# Patient Record
Sex: FEMALE | Race: WHITE | NOT HISPANIC OR LATINO | Employment: PART TIME | URBAN - METROPOLITAN AREA
[De-identification: names, ages, dates, MRNs, and addresses within clinical notes are randomized per-mention and may not be internally consistent; named-entity substitution may affect disease eponyms.]

---

## 2017-01-09 ENCOUNTER — PRENATAL OFFICE VISIT (OUTPATIENT)
Dept: FAMILY MEDICINE | Facility: CLINIC | Age: 22
End: 2017-01-09
Payer: COMMERCIAL

## 2017-01-09 VITALS
SYSTOLIC BLOOD PRESSURE: 121 MMHG | TEMPERATURE: 97.3 F | BODY MASS INDEX: 26.82 KG/M2 | DIASTOLIC BLOOD PRESSURE: 61 MMHG | WEIGHT: 155 LBS | HEART RATE: 94 BPM

## 2017-01-09 DIAGNOSIS — O34.219 PREVIOUS CESAREAN DELIVERY, ANTEPARTUM CONDITION OR COMPLICATION: ICD-10-CM

## 2017-01-09 DIAGNOSIS — Z34.80 ENCOUNTER FOR SUPERVISION OF OTHER NORMAL PREGNANCY: Primary | ICD-10-CM

## 2017-01-09 PROCEDURE — 99207 ZZC PRENATAL VISIT: CPT | Performed by: FAMILY MEDICINE

## 2017-01-09 NOTE — NURSING NOTE
"Chief Complaint   Patient presents with     Prenatal Care       Initial /61 mmHg  Pulse 94  Temp(Src) 97.3  F (36.3  C) (Oral)  Wt 155 lb (70.308 kg)  LMP 06/10/2016 (Exact Date) Estimated body mass index is 26.82 kg/(m^2) as calculated from the following:    Height as of 8/3/16: 5' 3.75\" (1.619 m).    Weight as of this encounter: 155 lb (70.308 kg).  BP completed using cuff size: lester Lester CMA      "

## 2017-01-09 NOTE — MR AVS SNAPSHOT
After Visit Summary   2017    Lu Syed    MRN: [id number]           Patient Information     Date Of Birth          1995        Visit Information        Provider Department      2017 12:00 PM Valeria Ward MD [facility]        Today's Diagnoses     Encounter for supervision of other normal pregnancy    -  1     Previous  delivery, antepartum condition or complication           Care Instructions    [place] [facility] [phone]      Next visit 2 weeks        Follow-ups after your visit        Your next 10 appointments already scheduled     2017 12:00 PM   ESTABLISHED PRENATAL with Valeria Ward MD   [facility] ([facility])    [address] [facility] [address] [phone]            2017  1:00 PM   ESTABLISHED PRENATAL with Cephas Mawuena Agbeh, MD   [facility]    [address] [facility] [address] [phone]            2017  1:00 PM   ESTABLISHED PRENATAL with Cephas Mawuena Agbeh, MD   [facility] ([facility])    [address] [address] [phone]              Who to contact     If you have questions or need follow up information about today's clinic visit or your schedule please contact [facility] directly at [phone].  Normal or non-critical lab and imaging results will be communicated to you by MyChart, letter or phone within 4 business days after the clinic has received the results. If you do not hear from us within 7 days, please contact the clinic through MyChart or phone. If you have a critical or abnormal lab result, we will notify you by phone as soon as possible.  Submit refill requests through blinkbox or call your pharmacy and they will forward the refill request to us. Please allow 3 business days for your refill to be completed.           Additional Information About Your Visit        MyChart Information     [employer] gives you secure access to your electronic health record. If you see a primary care provider, you can also send messages to your care team and make appointments. If you have questions, please call your primary care clinic.  If you do not have a primary care provider, please call [phone] and they will assist you.        Care EveryWhere ID     This is your Care EveryWhere ID. This could be used by other organizations to access your [place] medical records  [id number]        Your Vitals Were     Pulse Temperature Last Period             94 97.3  F (36.3  C) (Oral) 06/10/2016 (Exact Date)          Blood Pressure from Last 3 Encounters:   01/09/17 121/61   12/26/16 118/58   12/12/16 102/63    Weight from Last 3 Encounters:   01/09/17 155 lb (70.308 kg)   12/26/16 152 lb 6.4 oz (69.128 kg)   12/12/16 147 lb (66.679 kg)              Today, you had the following     No orders found for display       Primary Care Provider Office Phone # Fax #    Valeria Ward [phone]       [facility] [address] [facility] [address]        Thank you!     Thank you for choosing [facility]  for your care. Our goal is always to provide you with excellent care. Hearing back from our patients is one way we can continue to improve our services. Please take a few minutes to complete the written survey that you may receive in the mail after your visit with us. Thank you!             Your Updated Medication List - Protect others around you: Learn how to safely use, store and throw away your medicines at www.disposemymeds.org.          This list is accurate as of: 1/9/17 12:10 PM.  Always use your most recent med list.                   Brand Name Dispense Instructions for use    order for DME     1 each    Short boot/walking boot. R. Size small.       PRENATAL VITAMIN PO      Take 1 tablet by mouth daily

## 2017-01-09 NOTE — PROGRESS NOTES
Reportable signs and symptoms discussed.  Prenatal flowsheet information is reviewed.  Doing well.  No concerns today.  Discussed kick counts and fetal movement.  Another visit with Valeria Ward MD in 2 weeks then will transfer care to OB for repeat .

## 2017-01-22 NOTE — PROGRESS NOTES
Doing well.  No concerns today.  Prenatal flowsheet information is reviewed.  Discussed kick counts and fetal movement.  Reportable signs and symptoms discussed.  tdap today,  Next visit with Dr. Agbeh then f/u until  with Valeria Ward MD

## 2017-01-22 NOTE — PATIENT INSTRUCTIONS
[place] [facility] [phone]       Next visit 2 weeks with Dr. Agbeh, then Valeria Ward MD at 36 weeks.

## 2017-01-23 ENCOUNTER — PRENATAL OFFICE VISIT (OUTPATIENT)
Dept: FAMILY MEDICINE | Facility: CLINIC | Age: 22
End: 2017-01-23
Payer: COMMERCIAL

## 2017-01-23 VITALS
WEIGHT: 154 LBS | HEART RATE: 93 BPM | SYSTOLIC BLOOD PRESSURE: 117 MMHG | DIASTOLIC BLOOD PRESSURE: 57 MMHG | TEMPERATURE: 98.7 F | BODY MASS INDEX: 26.65 KG/M2

## 2017-01-23 DIAGNOSIS — O34.219 PREVIOUS CESAREAN DELIVERY, ANTEPARTUM CONDITION OR COMPLICATION: ICD-10-CM

## 2017-01-23 DIAGNOSIS — Z34.80 ENCOUNTER FOR SUPERVISION OF OTHER NORMAL PREGNANCY: Primary | ICD-10-CM

## 2017-01-23 DIAGNOSIS — Z23 NEED FOR TDAP VACCINATION: ICD-10-CM

## 2017-01-23 PROCEDURE — 90715 TDAP VACCINE 7 YRS/> IM: CPT | Performed by: FAMILY MEDICINE

## 2017-01-23 PROCEDURE — 99207 ZZC PRENATAL VISIT: CPT | Performed by: FAMILY MEDICINE

## 2017-01-23 NOTE — NURSING NOTE
"Chief Complaint   Patient presents with     Prenatal Care       Initial /57 mmHg  Pulse 93  Temp(Src) 98.7  F (37.1  C) (Oral)  Wt 154 lb (69.854 kg)  LMP 06/10/2016 (Exact Date) Estimated body mass index is 26.65 kg/(m^2) as calculated from the following:    Height as of 8/3/16: 5' 3.75\" (1.619 m).    Weight as of this encounter: 154 lb (69.854 kg).  BP completed using cuff size: lester Lester CMA      "

## 2017-01-23 NOTE — MR AVS SNAPSHOT
After Visit Summary   2017    Lu Syed    MRN: [id number]           Patient Information     Date Of Birth          1995        Visit Information        Provider Department      2017 12:00 PM Valeria Ward MD [facility]        Today's Diagnoses     Encounter for supervision of other normal pregnancy    -  1     Previous  delivery, antepartum condition or complication         Need for Tdap vaccination           Care Instructions    [place] [facility] [phone]       Next visit 2 weeks with Dr. Agbeh, then Valeria Ward MD at 36 weeks.        Follow-ups after your visit        Your next 10 appointments already scheduled     2017  1:00 PM   ESTABLISHED PRENATAL with Cephas Mawuena Agbeh, MD   [facility]    [address]  [place] [id number] [phone]            2017  1:00 PM   ESTABLISHED PRENATAL with Cephas Mawuena Agbeh, MD   [facility]    [address] [facility] [id number] [phone]              Who to contact     If you have questions or need follow up information about today's clinic visit or your schedule please contact [facility] directly at [phone].  Normal or non-critical lab and imaging results will be communicated to you by MyChart, letter or phone within 4 business days after the clinic has received the results. If you do not hear from us within 7 days, please contact the clinic through MyChart or phone. If you have a critical or abnormal lab result, we will notify you by phone as soon as possible.  Submit refill requests through [employer] or call your pharmacy and they will forward the refill request to us. Please allow 3 business days for your refill to be completed.          Additional Information About Your Visit        [employer] Information     [employer] gives you secure access to your  electronic health record. If you see a primary care provider, you can also send messages to your care team and make appointments. If you have questions, please call your primary care clinic.  If you do not have a primary care provider, please call [phone] and they will assist you.        Care EveryWhere ID     This is your Care EveryWhere ID. This could be used by other organizations to access your [place] medical records  [id number]        Your Vitals Were     Pulse Temperature Last Period             93 98.7  F (37.1  C) (Oral) 06/10/2016 (Exact Date)          Blood Pressure from Last 3 Encounters:   01/23/17 117/57   01/09/17 121/61   12/26/16 118/58    Weight from Last 3 Encounters:   01/23/17 154 lb (69.854 kg)   01/09/17 155 lb (70.308 kg)   12/26/16 152 lb 6.4 oz (69.128 kg)              We Performed the Following     TDAP (ADACEL AGES 11-64)        Primary Care Provider Office Phone # Fax #    Valeria Ward [phone]       [facility] [address] [facility] [address]        Thank you!     Thank you for choosing [facility]  for your care. Our goal is always to provide you with excellent care. Hearing back from our patients is one way we can continue to improve our services. Please take a few minutes to complete the written survey that you may receive in the mail after your visit with us. Thank you!             Your Updated Medication List - Protect others around you: Learn how to safely use, store and throw away your medicines at www.disposemymeds.org.          This list is accurate as of: 1/23/17 12:11 PM.  Always use your most recent med list.                   Brand Name Dispense Instructions for use    order for DME     1 each    Short boot/walking boot. R. Size small.       PRENATAL VITAMIN PO      Take 1 tablet by mouth daily

## 2017-02-06 ENCOUNTER — PRENATAL OFFICE VISIT (OUTPATIENT)
Dept: OBGYN | Facility: CLINIC | Age: 22
End: 2017-02-06
Payer: COMMERCIAL

## 2017-02-06 VITALS
HEART RATE: 103 BPM | DIASTOLIC BLOOD PRESSURE: 82 MMHG | BODY MASS INDEX: 27.17 KG/M2 | OXYGEN SATURATION: 96 % | TEMPERATURE: 98.3 F | SYSTOLIC BLOOD PRESSURE: 142 MMHG | WEIGHT: 157 LBS

## 2017-02-06 DIAGNOSIS — O34.219 PREVIOUS CESAREAN DELIVERY, ANTEPARTUM CONDITION OR COMPLICATION: Primary | ICD-10-CM

## 2017-02-06 DIAGNOSIS — Z34.80 ENCOUNTER FOR SUPERVISION OF OTHER NORMAL PREGNANCY: ICD-10-CM

## 2017-02-06 PROCEDURE — 99207 ZZC PRENATAL VISIT: CPT | Performed by: OBSTETRICS & GYNECOLOGY

## 2017-02-06 NOTE — PATIENT INSTRUCTIONS
If you have any questions regarding your visit, Please contact your care team.    Women s Health CLINIC HOURS TELEPHONE NUMBER   Wilma Agbeh, M.D.    Sabine Manjarrez- RAMIREZ Castillo - RAMIREZ Nesbitt -         Monday-[facility]  8:00 am - 5 pm  Tuesday- [facility]  8:00am- 5 pm  Wednesday- Off  Thursday- [facility]  8:00 am- 5 pm  Friday-[place]  8:00 am 5 pm [facility]  [address] [place] [address]  [phone] ask for Women's [facility]    Imaging [phone]    [facility]  [address] [address]  [place] [address] [phone]  Imaging [phone]     Urgent Care locations:    [facility] Saturday and Sunday   9 am - 5 pm    Monday-Friday   12 pm - 8 pm  Saturday and Sunday   9 am - 5 pm   [phone]       If you need a medication refill, please contact your pharmacy. Please allow 3 business days for your refill to be completed.  As always, Thank you for trusting us with your healthcare needs!

## 2017-02-06 NOTE — MR AVS SNAPSHOT
After Visit Summary   2/6/2017    Lu Syed    MRN: [id number]           Patient Information     Date Of Birth          1995        Visit Information        Provider Department      2/6/2017 1:00 PM Agbeh, Cephas Mawuena, MD [facility]        Care Instructions                                                           If you have any questions regarding your visit, Please contact your care team.    Women s Health CLINIC HOURS TELEPHONE NUMBER   Cephas Agbeh, M.D. Kristen - CARMEL Manjarrez- RAMIREZ Castillo - RAMIREZ Nesbitt -         Monday-[facility]  8:00 am - 5 pm  Tuesday- [facility]  8:00am- 5 pm  Wednesday- Off  Thursday- [facility]  8:00 am- 5 pm  Friday-[place]  8:00 am 5 pm [facility]  [address] [place] [address]  [phone] ask for Women's [facility]    Imaging [phone]    [facility]  [address] [place], [place] [address] [phone]  Imaging [phone]     Urgent Care locations:    [facility] Saturday and Sunday   9 am - 5 pm    Monday-Friday   12 pm - 8 pm  Saturday and Sunday   9 am - 5 pm   [phone]       If you need a medication refill, please contact your pharmacy. Please allow 3 business days for your refill to be completed.  As always, Thank you for trusting us with your healthcare needs!               Follow-ups after your visit        Your next 10 appointments already scheduled     Feb 20, 2017 12:00 PM   ESTABLISHED PRENATAL [facility]    [address] [facility] [address] [phone]            Feb 27, 2017  1:00 PM   ESTABLISHED PRENATAL with Cephas Mawuena Agbeh, MD   [facility]    [address] [facility] [address]   [phone]            Mar 06, 2017 12:00 PM   ESTABLISHED PRENATAL  with Valeria Ward MD   [facility] ([facility]    [address] [facility] [address] [phone]              Who to contact     If you have questions or need follow up information about today's clinic visit or your schedule please contact [facility] directly at [phone].  Normal or non-critical lab and imaging results will be communicated to you by MyChart, letter or phone within 4 business days after the clinic has received the results. If you do not hear from us within 7 days, please contact the clinic through "[employer]hart or phone. If you have a critical or abnormal lab result, we will notify you by phone as soon as possible.  Submit refill requests through [employer] or call your pharmacy and they will forward the refill request to us. Please allow 3 business days for your refill to be completed.          Additional Information About Your Visit        "[employer]Information Gateway Information     [employer] gives you secure access to your electronic health record. If you see a primary care provider, you can also send messages to your care team and make appointments. If you have questions, please call your primary care clinic.  If you do not have a primary care provider, please call [phone] and they will assist you.        Care EveryWhere ID     This is your Care EveryWhere ID. This could be used by other organizations to access your [place] medical records  [id number]        Your Vitals Were     Pulse Temperature Pulse Oximetry Last Period          103 98.3  F (36.8  C) (Tympanic) 96% 06/10/2016 (Exact Date)         Blood Pressure from Last 3 Encounters:   02/06/17 142/82   01/23/17 117/57   01/09/17 121/61    Weight from Last 3 Encounters:   02/06/17 157 lb (71.215 kg)   01/23/17 154 lb (69.854 kg)   01/09/17 155 lb (70.308 kg)              Today, you had the following     No orders found for display       Primary Care Provider Office Phone # Fax #    Valeria Pike  MD Ed [phone]       [facility] [address] [address] [facility] [address]        Thank you!     Thank you for choosing [facility]  for your care. Our goal is always to provide you with excellent care. Hearing back from our patients is one way we can continue to improve our services. Please take a few minutes to complete the written survey that you may receive in the mail after your visit with us. Thank you!             Your Updated Medication List - Protect others around you: Learn how to safely use, store and throw away your medicines at www.disposemymeds.org.          This list is accurate as of: 2/6/17  1:07 PM.  Always use your most recent med list.                   Brand Name Dispense Instructions for use    order for DME     1 each    Short boot/walking boot. R. Size small.       PRENATAL VITAMIN PO      Take 1 tablet by mouth daily

## 2017-02-06 NOTE — NURSING NOTE
"Chief Complaint   Patient presents with     Prenatal Care     34.3       Initial /82 mmHg  Pulse 103  Temp(Src) 98.3  F (36.8  C) (Tympanic)  Wt 157 lb (71.215 kg)  SpO2 96%  LMP 06/10/2016 (Exact Date) Estimated body mass index is 27.17 kg/(m^2) as calculated from the following:    Height as of 8/3/16: 5' 3.75\" (1.619 m).    Weight as of this encounter: 157 lb (71.215 kg)..  Sabine Jessica LPN   "

## 2017-02-06 NOTE — PROGRESS NOTES
34w3d.  Tired.  No HA, visual changes, N/V etc.Asnwered all her questions regarding her upcoming repeat C/S on 3/10/17. Routine prenatal care.. RTC 2 wk/prn.    ICD-10-CM    1. Previous  delivery, antepartum condition or complication O34.219    2. Encounter for supervision of other normal pregnancy Z34.80      CEPHAS AGBEH, MD.

## 2017-02-07 ENCOUNTER — TELEPHONE (OUTPATIENT)
Dept: FAMILY MEDICINE | Facility: CLINIC | Age: 22
End: 2017-02-07

## 2017-02-07 NOTE — TELEPHONE ENCOUNTER
Patient is having repeat C/S with Dr Agbeh, forwarding message onto his care team.  Osiris Paul RN

## 2017-02-07 NOTE — TELEPHONE ENCOUNTER
Left message on answering machine for patient/parent to call back.   [phone].  Osiris Paul RN

## 2017-02-07 NOTE — TELEPHONE ENCOUNTER
This writer attempted to contact Lu on 02/07/2017.    Was call answered?  No.  Left message on voicemail with information to call me back. Explained clinic is closing at 1700. Not sure if pt fell hard and landed on her stomach or her back. Not sure if she is bleeding. Do noted no abd pain. Explained if she landed hard and is having bleeding she needs to be seen to evaluate and the best place to be seen would be L & D. Explained she can call back after hours if concerned and she can speak to FNA to get advise.     If patient calls back, please Contact Clinic RN team. If no one available, send encounter message    Edwige Wang RN, BAN

## 2017-02-07 NOTE — TELEPHONE ENCOUNTER
Patient is calling stating 34 weeks pregnant and fell on the ice wondering if she should come in to be seen what she should do. Patient is not having any abdominal pain or pain anywhere else, just fell and coworker at [facility] [employer] suggest she should call. Please call to discuss. Thank you.

## 2017-02-08 NOTE — TELEPHONE ENCOUNTER
Per patient, fell yesterday at on the ice and fell onto her buttocks.    Fell hard.  Was holding her 1.5 year old.    Did not follow up.   Good fetal movement.  Was working at [employer].   Has cramping, which is normal, gerson sanchez.     Information above is reviewed with Dr Valeria Ward, as long as patient is feeling well, has GFM, no cramping or bleeding, will have patient continue to monitor and follow up prn.  The patient/parent agrees with the plan and verbalized good understanding.    Per protocol, will route encounter to be cosigned by provider for Verbal Orders.  Osiris Paul RN

## 2017-02-20 ENCOUNTER — PRENATAL OFFICE VISIT (OUTPATIENT)
Dept: FAMILY MEDICINE | Facility: CLINIC | Age: 22
End: 2017-02-20
Payer: COMMERCIAL

## 2017-02-20 VITALS
DIASTOLIC BLOOD PRESSURE: 70 MMHG | TEMPERATURE: 98.2 F | SYSTOLIC BLOOD PRESSURE: 127 MMHG | WEIGHT: 160 LBS | HEART RATE: 93 BPM | BODY MASS INDEX: 27.68 KG/M2

## 2017-02-20 DIAGNOSIS — Z34.80 ENCOUNTER FOR SUPERVISION OF OTHER NORMAL PREGNANCY: Primary | ICD-10-CM

## 2017-02-20 PROCEDURE — 99207 ZZC PRENATAL VISIT: CPT | Performed by: FAMILY MEDICINE

## 2017-02-20 PROCEDURE — 87653 STREP B DNA AMP PROBE: CPT | Performed by: FAMILY MEDICINE

## 2017-02-20 NOTE — NURSING NOTE
"Chief Complaint   Patient presents with     Prenatal Care       Initial /70  Pulse 93  Temp 98.2  F (36.8  C) (Oral)  Wt 160 lb (72.6 kg)  LMP 06/10/2016 (Exact Date)  BMI 27.68 kg/m2 Estimated body mass index is 27.68 kg/(m^2) as calculated from the following:    Height as of 8/3/16: 5' 3.75\" (1.619 m).    Weight as of this encounter: 160 lb (72.6 kg).  Medication Reconciliation: complete  Usha Lester , CMA     "

## 2017-02-20 NOTE — MR AVS SNAPSHOT
After Visit Summary   2/20/2017    uL Syed    MRN: [id number]           Patient Information     Date Of Birth          1995        Visit Information        Provider Department      2/20/2017 12:00 PM Valeria Ward MD [facility]        Today's Diagnoses     Encounter for supervision of other normal pregnancy    -  1      Care Instructions    [place] [facility] [phone]       Weekly visits        Follow-ups after your visit        Your next 10 appointments already scheduled     Feb 27, 2017  1:00 PM CST   ESTABLISHED PRENATAL with Cephas Mawuena Agbeh, MD   [facility]    [address] [facility] [address] [phone]            Mar 06, 2017 12:00 PM CST   ESTABLISHED PRENATAL with Valeria Ward MD   [facility]    [address] [facility] [address] [phone]              Who to contact     If you have questions or need follow up information about today's clinic visit or your schedule please contact [facility] directly at [phone].  Normal or non-critical lab and imaging results will be communicated to you by MyChart, letter or phone within 4 business days after the clinic has received the results. If you do not hear from us within 7 days, please contact the clinic through GoodGuidehart or phone. If you have a critical or abnormal lab result, we will notify you by phone as soon as possible.  Submit refill requests through [employer]Go or call your pharmacy and they will forward the refill request to us. Please allow 3 business days for your refill to be completed.          Additional Information About Your Visit        MyChart Information     [employer] gives you secure access to your electronic health record. If you see a primary care provider, you can also send messages to your care team and make appointments. If you have questions, please call  your primary care clinic.  If you do not have a primary care provider, please call [phone] and they will assist you.        Care EveryWhere ID     This is your Care EveryWhere ID. This could be used by other organizations to access your [place] medical records  [id number]        Your Vitals Were     Pulse Temperature Last Period BMI (Body Mass Index)          93 98.2  F (36.8  C) (Oral) 06/10/2016 (Exact Date) 27.68 kg/m2         Blood Pressure from Last 3 Encounters:   02/20/17 127/70   02/06/17 142/82   01/23/17 117/57    Weight from Last 3 Encounters:   02/20/17 160 lb (72.6 kg)   02/06/17 157 lb (71.2 kg)   01/23/17 154 lb (69.9 kg)              We Performed the Following     Group B strep PCR        Primary Care Provider Office Phone # Fax #    Valeria Ward [phone]       [facility] [address] [facility] [address]        Thank you!     Thank you for choosing [facility]  for your care. Our goal is always to provide you with excellent care. Hearing back from our patients is one way we can continue to improve our services. Please take a few minutes to complete the written survey that you may receive in the mail after your visit with us. Thank you!             Your Updated Medication List - Protect others around you: Learn how to safely use, store and throw away your medicines at www.disposemymeds.org.          This list is accurate as of: 2/20/17 12:07 PM.  Always use your most recent med list.                   Brand Name Dispense Instructions for use    PRENATAL VITAMIN PO      Take 1 tablet by mouth daily

## 2017-02-20 NOTE — PROGRESS NOTES
Doing well.  No concerns today.  Cervix is posterior, soft, closed, -1.  Cephalic position confirmed by Leopold maneuvers and cervical exam.  GBS done today.  Prenatal flowsheet information is reviewed.  Reportable signs and symptoms discussed.  F/u weekly

## 2017-02-21 LAB
GP B STREP DNA SPEC QL NAA+PROBE: NORMAL
SPECIMEN SOURCE: NORMAL

## 2017-02-23 ENCOUNTER — TELEPHONE (OUTPATIENT)
Dept: NURSING | Facility: CLINIC | Age: 22
End: 2017-02-23

## 2017-02-23 NOTE — TELEPHONE ENCOUNTER
"Call Type: Triage Call    Presenting Problem: Patient is \"37 weeks pregnant\" and is having  \"contractions/ sharp abdominal pain that is constant.\" Triaged for  pregnancy: labor, 20-37 weeks/Disposition to call provider  immediately. Triager called out to answering service, left message  for on call provider to call patient at [phone].  Triage Note:  Guideline Title: Pregnancy: Signs of Labor, 37 Weeks or Greater ;  Pregnancy:  Labor, 20 to 37 Weeks  Recommended Disposition: Call Provider Immediately  Original Inclination: Wanted to speak with a nurse  Override Disposition:  Intended Action: Call PCP/HCP  Physician Contacted: No  Gestation 20 to 37 weeks ?  YES  New or worsening signs and symptoms that may indicate shock ? NO  Feeling of baby coming or wanting to push (urge to bear down) ? NO  Umbilical cord or any part of the baby (head, bottom, arm or leg) at the opening  of the vagina ? NO  No relief between contractions ? NO  Continuous bright red vaginal bleeding for more than 15 minutes (more than  spotting) ? NO  Sudden gush or trickle of fluid from the vagina ? NO  New or worsening signs and symptoms that may indicate shock ? NO  Gestation more than 37 weeks AND signs of labor ? NO  Gestation less than 20 weeks AND signs of labor ? NO  Feeling of baby coming or wanting to push (urge to bear down) ? NO  Unbearable abdominal/pelvic pain ? NO  Umbilical cord or any part of the baby (head, bottom, arm or leg) at the opening  of the vagina ? NO  Gush or leakage of green or green-tinged or port-wine colored fluid (reddish and  watery) from the vagina ? NO  Contractions occurring 4 times every 20 minutes OR 8 contractions occurring in an  hour. ? NO  No relief between contractions ? NO  Continuous bright red vaginal bleeding for more than 15 minutes (more than  spotting) ? NO  Gestation 20 weeks or more AND decreased fetal movement compared to previous  activity ? NO  Physician Instructions:  Care " Advice: Do not give the patient anything to eat or drink.  IMMEDIATE ACTION  See another provider immediately if unable to talk with your provider  within 1 hour. Follow the directions from your provider's on call resource  if you are unable to speak to your provider directly.  You may be directed  to go to the hospital's Labor & Delivery department for evaluation. Another  adult should drive.  Lie on left side to improve circulation to the fetus.  Call  if any of these occur: profuse bright red vaginal bleeding  continuous (without relaxation) abdominal pain  the umbilical cord or any fetal part in vagina  bag of suarez coming through vagina  feeling of wanting to push or have a bowel movement.

## 2017-02-27 ENCOUNTER — PRENATAL OFFICE VISIT (OUTPATIENT)
Dept: OBGYN | Facility: CLINIC | Age: 22
End: 2017-02-27
Payer: COMMERCIAL

## 2017-02-27 VITALS
HEART RATE: 87 BPM | OXYGEN SATURATION: 96 % | TEMPERATURE: 97.5 F | SYSTOLIC BLOOD PRESSURE: 114 MMHG | BODY MASS INDEX: 27.85 KG/M2 | DIASTOLIC BLOOD PRESSURE: 59 MMHG | WEIGHT: 161 LBS

## 2017-02-27 DIAGNOSIS — Z34.80 ENCOUNTER FOR SUPERVISION OF OTHER NORMAL PREGNANCY: ICD-10-CM

## 2017-02-27 DIAGNOSIS — O34.219 PREVIOUS CESAREAN DELIVERY, ANTEPARTUM CONDITION OR COMPLICATION: Primary | ICD-10-CM

## 2017-02-27 PROCEDURE — 99207 ZZC PRENATAL VISIT: CPT | Performed by: OBSTETRICS & GYNECOLOGY

## 2017-02-27 NOTE — MR AVS SNAPSHOT
After Visit Summary   2/27/2017    Lu Syed    MRN: [id number]           Patient Information     Date Of Birth          1995        Visit Information        Provider Department      2/27/2017 1:00 PM Agbeh, Cephas Mawuena, MD [facility]        Care Instructions                                                           If you have any questions regarding your visit, Please contact your care team.    Women s Health CLINIC HOURS TELEPHONE NUMBER   Cephas Agbeh, M.D. Kristen - CARMEL Manjarrez- RAMIREZ Castillo - RAMIREZ Nesbitt -         Monday-[facility]  8:00 am - 5 pm  Tuesday- [facility]  8:00am- 5 pm  Wednesday- Off  Thursday- [facility]  8:00 am- 5 pm  Friday-[place]  8:00 am 5 pm [facility]  [address]  [place], [place] [address] [phone] ask for [facility]    Imaging [phone]    [facility]  [address] [place], [place] [address] [phone]  Imaging [phone]     Urgent Care locations:    [facility] Saturday and Sunday   9 am - 5 pm    Monday-Friday   12 pm - 8 pm  Saturday and Sunday   9 am - 5 pm   [phone]       If you need a medication refill, please contact your pharmacy. Please allow 3 business days for your refill to be completed.  As always, Thank you for trusting us with your healthcare needs!               Follow-ups after your visit        Your next 10 appointments already scheduled     Feb 27, 2017  1:00 PM CST   ESTABLISHED PRENATAL with Cephas Mawuena Agbeh, MD   [facility] ([facility])    [address] [id number] [phone]            Mar 06, 2017 12:00 PM CST   ESTABLISHED PRENATAL with Valeria Ward MD   [facility]    [address] [facility] [address] [phone]              Who to contact     If you have  questions or need follow up information about today's clinic visit or your schedule please contact [facility] directly at [phone].  Normal or non-critical lab and imaging results will be communicated to you by MyChart, letter or phone within 4 business days after the clinic has received the results. If you do not hear from us within 7 days, please contact the clinic through Courseloadhart or phone. If you have a critical or abnormal lab result, we will notify you by phone as soon as possible.  Submit refill requests through OpenTable or call your pharmacy and they will forward the refill request to us. Please allow 3 business days for your refill to be completed.          Additional Information About Your Visit        CourseloadharJajah Information     OpenTable gives you secure access to your electronic health record. If you see a primary care provider, you can also send messages to your care team and make appointments. If you have questions, please call your primary care clinic.  If you do not have a primary care provider, please call [phone] and they will assist you.        Care EveryWhere ID     This is your Care EveryWhere ID. This could be used by other organizations to access your [place] medical records  [id number]        Your Vitals Were     Pulse Temperature Last Period Pulse Oximetry BMI (Body Mass Index)       87 97.5  F (36.4  C) (Tympanic) 06/10/2016 (Exact Date) 96% 27.85 kg/m2        Blood Pressure from Last 3 Encounters:   02/27/17 114/59   02/20/17 127/70   02/06/17 142/82    Weight from Last 3 Encounters:   02/27/17 161 lb (73 kg)   02/20/17 160 lb (72.6 kg)   02/06/17 157 lb (71.2 kg)              Today, you had the following     No orders found for display       Primary Care Provider Office Phone # Fax #    Valeria Ward [phone]       [facility] [address] [facility] [address]        Thank you!     [facility] your [facility]. Our goal is always to provide you with excellent care. Hearing back from our patients is one way we can continue to improve our services. Please take a few minutes to complete the written survey that you may receive in the mail after your visit with us. Thank you!             Your Updated Medication List - Protect others around you: Learn how to safely use, store and throw away your medicines at www.disposemymeds.org.          This list is accurate as of: 2/27/17 12:56 PM.  Always use your most recent med list.                   Brand Name Dispense Instructions for use    PRENATAL VITAMIN PO      Take 1 tablet by mouth daily

## 2017-02-27 NOTE — PATIENT INSTRUCTIONS
If you have any questions regarding your visit, Please contact your care team.    Women s Health CLINIC HOURS TELEPHONE NUMBER   Wilma Agbeh, M.D.    Sabine Manjarrez- RAMIREZ Castillo - RAMIREZ Nesbitt -         Monday-[facility]  8:00 am - 5 pm  Tuesday- [facility]  8:00am- 5 pm  Wednesday- Off  Thursday- [facility]  8:00 am- 5 pm  Friday-[place]  8:00 am 5 pm [facility]  [address] [place] [address]  [phone] ask for [facility]    Imaging [phone]    [facility]  [address] [facility]  [place]ELA Valenzuela [address] [phone]  Imaging [phone]     Urgent Care locations:    [facility] Saturday and Sunday   9 am - 5 pm    Monday-Friday   12 pm - 8 pm  Saturday and Sunday   9 am - 5 pm   [phone]       If you need a medication refill, please contact your pharmacy. Please allow 3 business days for your refill to be completed.  As always, Thank you for trusting us with your healthcare needs!

## 2017-02-27 NOTE — PROGRESS NOTES
37w3d  Was evaluated on L&D for threatened PTL last week. Contractions have resolved. She is back to work today.. Repeat C/S on 3/10/17..RTC 1 wk/prn.        ICD-10-CM    1. Previous  delivery, antepartum condition or complication O34.219    2. Encounter for supervision of other normal pregnancy Z34.80      CEPHAS AGBEH, MD.

## 2017-02-27 NOTE — NURSING NOTE
"Chief Complaint   Patient presents with     Prenatal Care     37.3       Initial /59  Pulse 87  Temp 97.5  F (36.4  C) (Tympanic)  Wt 161 lb (73 kg)  LMP 06/10/2016 (Exact Date)  SpO2 96%  BMI 27.85 kg/m2 Estimated body mass index is 27.85 kg/(m^2) as calculated from the following:    Height as of 8/3/16: 5' 3.75\" (1.619 m).    Weight as of this encounter: 161 lb (73 kg).  Medication Reconciliation: complete   "

## 2017-03-06 ENCOUNTER — PRENATAL OFFICE VISIT (OUTPATIENT)
Dept: FAMILY MEDICINE | Facility: CLINIC | Age: 22
End: 2017-03-06
Payer: COMMERCIAL

## 2017-03-06 VITALS
TEMPERATURE: 97.5 F | BODY MASS INDEX: 28.2 KG/M2 | SYSTOLIC BLOOD PRESSURE: 121 MMHG | OXYGEN SATURATION: 95 % | DIASTOLIC BLOOD PRESSURE: 67 MMHG | WEIGHT: 163 LBS | HEART RATE: 93 BPM

## 2017-03-06 DIAGNOSIS — Z34.80 ENCOUNTER FOR SUPERVISION OF OTHER NORMAL PREGNANCY: ICD-10-CM

## 2017-03-06 DIAGNOSIS — O34.219 PREVIOUS CESAREAN DELIVERY, ANTEPARTUM CONDITION OR COMPLICATION: ICD-10-CM

## 2017-03-06 DIAGNOSIS — Z01.818 PREOP GENERAL PHYSICAL EXAM: Primary | ICD-10-CM

## 2017-03-06 PROCEDURE — 99214 OFFICE O/P EST MOD 30 MIN: CPT | Performed by: FAMILY MEDICINE

## 2017-03-06 NOTE — PATIENT INSTRUCTIONS
[place] [facility] [phone]        Call after delivery for appointment for baby.  Before Your Surgery      Call your surgeon if there is any change in your health. This includes signs of a cold or flu (such as a sore throat, runny nose, cough, rash or fever).    Do not smoke, drink alcohol or take over the counter medicine (unless your surgeon or primary care doctor tells you to) for the 24 hours before and after surgery.    If you take prescribed drugs: Follow your doctor s orders about which medicines to take and which to stop until after surgery.    Eating and drinking prior to surgery: follow the instructions from your surgeon    Take a shower or bath the night before surgery. Use the soap your surgeon gave you to gently clean your skin. If you do not have soap from your surgeon, use your regular soap. Do not shave or scrub the surgery site.  Wear clean pajamas and have clean sheets on your bed.

## 2017-03-06 NOTE — PROGRESS NOTES
[facility]  [address] [address] [facility] [address]  [phone]  Dept: [phone]    PRE-OP EVALUATION:  Today's date: 3/6/2017    Lu Syed (: 1995) presents for pre-operative evaluation assessment as requested by Dr. Agbeh.  She requires evaluation and anesthesia risk assessment prior to undergoing surgery/procedure for treatment of  .  Proposed procedure:      Date of Surgery/ Procedure: 03/10/2017  Time of Surgery/ Procedure: 12:30p  Hospital/Surgical Facility: [facility]   Primary Physician: Valeria Ward  Type of Anesthesia Anticipated: Spinal    Patient has a Health Care Directive or Living Will:  NO    1. NO - Do you have a history of heart attack, stroke, stent, bypass or surgery on an artery in the head, neck, heart or legs?  2. NO - Do you ever have any pain or discomfort in your chest?  3. NO - Do you have a history of  Heart Failure?  4. NO - Are you troubled by shortness of breath when: walking on the level, up a slight hill or at night?  5. NO - Do you currently have a cold, bronchitis or other respiratory infection?  6. NO - Do you have a cough, shortness of breath or wheezing?  7. NO - Do you sometimes get pains in the calves of your legs when you walk?  8. NO - Do you or anyone in your family have previous history of blood clots?  9. NO - Do you or does anyone in your family have a serious bleeding problem such as prolonged bleeding following surgeries or cuts?  10. YES - HAVE YOU EVER HAD PROBLEMS WITH ANEMIA OR BEEN TOLD TO TAKE IRON PILLS? Only while pregnant  11. NO - Have you had any abnormal blood loss such as black, tarry or bloody stools, or abnormal vaginal bleeding?  12. NO - Have you ever had a blood transfusion?  13. NO - Have you or any of your relatives ever had problems with anesthesia?  14. NO - Do you have sleep apnea, excessive snoring or daytime drowsiness?  15. NO - Do you have any prosthetic heart  valves?  16. NO - Do you have prosthetic joints?  17. YES - IS THERE ANY CHANCE THAT YOU MAY BE PREGNANT? Yes- patient currently pregnant      HPI:                                                      Brief HPI related to upcoming procedure: repeat low transverse .  Normal uncomplicated pregnancy      See problem list for active medical problems.  Problems all longstanding and stable, except as noted/documented.  See ROS for pertinent symptoms related to these conditions.                                                                                                  .    MEDICAL HISTORY:                                                      Patient Active Problem List    Diagnosis Date Noted     Encounter for supervision of other normal pregnancy 2015     Priority: Medium     Diagnosis updated by automated process. Provider to review and confirm.       Previous  delivery, antepartum condition or complication 2015     Priority: Medium      History reviewed. No pertinent past medical history.  Past Surgical History   Procedure Laterality Date      section  , 2015     x 2     Current Outpatient Prescriptions   Medication Sig Dispense Refill     Prenatal Vit-Fe Fumarate-FA (PRENATAL VITAMIN PO) Take 1 tablet by mouth daily       OTC products: None, except as noted above    No Known Allergies   Latex Allergy: NO    Social History   Substance Use Topics     Smoking status: Never Smoker     Smokeless tobacco: Never Used     Alcohol use No     History   Drug Use No       REVIEW OF SYSTEMS:                                                    C: NEGATIVE for fever, chills, change in weight  E/M: NEGATIVE for ear, mouth and throat problems  R: NEGATIVE for significant cough or SOB  CV: NEGATIVE for chest pain, palpitations or peripheral edema    EXAM:                                                    /67  Pulse 93  Temp 97.5  F (36.4  C) (Oral)  Wt 163 lb (73.9 kg)  LMP 06/10/2016  (Exact Date)  SpO2 95%  BMI 28.2 kg/m2  GENERAL APPEARANCE: healthy, alert and no distress  HENT: ear canals and TM's normal and nose and mouth without ulcers or lesions  RESP: lungs clear to auscultation - no rales, rhonchi or wheezes  CV: regular rate and rhythm, normal S1 S2, no S3 or S4 and no murmur, click or rub   ABDOMEN: soft, nontender, no HSM or masses and bowel sounds normal  NEURO: Normal strength and tone, sensory exam grossly normal, mentation intact and speech normal    DIAGNOSTICS:                                                    No labs or EKG required for low risk surgery (cataract, skin procedure, breast biopsy, etc)    Recent Labs   Lab Test  16   1729  16   1044  10/02/14   HGB  10.2*  12.1   < >  13   PLT   --   356   --   405    < > = values in this interval not displayed.        IMPRESSION:                                                    Reason for surgery/procedure: repeat low transverse .  Normal uncomplicated pregnancy      The proposed surgical procedure is considered LOW risk.    REVISED CARDIAC RISK INDEX  The patient has the following serious cardiovascular risks for perioperative complications such as (MI, PE, VFib and 3  AV Block):  No serious cardiac risks  INTERPRETATION: 0 risks: Class I (very low risk - 0.4% complication rate)    The patient has the following additional risks for perioperative complications:  No identified additional risks      ICD-10-CM    1. Preop general physical exam Z01.818    2. Encounter for supervision of other normal pregnancy Z34.80    3. Previous  delivery, antepartum condition or complication O34.219        RECOMMENDATIONS:                                                          --Patient is to take all scheduled medications on the day of surgery.  APPROVAL GIVEN to proceed with proposed procedure, without further diagnostic evaluation       Signed Electronically by: Valeria Ward MD    Copy of this evaluation  report is provided to requesting physician.    [place] Preop Guidelines

## 2017-03-06 NOTE — MR AVS SNAPSHOT
After Visit Summary   3/6/2017    Lu Syed    MRN: [id number]           Patient Information     Date Of Birth          1995        Visit Information        Provider Department      3/6/2017 12:00 PM Valeria Ward MD [facility]        Today's Diagnoses     Preop general physical exam    -  1    Encounter for supervision of other normal pregnancy        Previous  delivery, antepartum condition or complication          Care Instructions    [place] L&D [phone]        Call after delivery for appointment for baby.  Before Your Surgery      Call your surgeon if there is any change in your health. This includes signs of a cold or flu (such as a sore throat, runny nose, cough, rash or fever).    Do not smoke, drink alcohol or take over the counter medicine (unless your surgeon or primary care doctor tells you to) for the 24 hours before and after surgery.    If you take prescribed drugs: Follow your doctor s orders about which medicines to take and which to stop until after surgery.    Eating and drinking prior to surgery: follow the instructions from your surgeon    Take a shower or bath the night before surgery. Use the soap your surgeon gave you to gently clean your skin. If you do not have soap from your surgeon, use your regular soap. Do not shave or scrub the surgery site.  Wear clean pajamas and have clean sheets on your bed.         Follow-ups after your visit        Who to contact     If you have questions or need follow up information about today's clinic visit or your schedule please contact [facility] directly at [phone].  Normal or non-critical lab and imaging results will be communicated to you by MyChart, letter or phone within 4 business days after the clinic has received the results. If you do not hear from us within 7 days, please contact the clinic through MyChart or phone. If you have a critical or abnormal lab result,  we will notify you by phone as soon as possible.  Submit refill requests through [employer]over or call your pharmacy and they will forward the refill request to us. Please allow 3 business days for your refill to be completed.          Additional Information About Your Visit        Vide[employer] Communicationshart Information     [employer] gives you secure access to your electronic health record. If you see a primary care provider, you can also send messages to your care team and make appointments. If you have questions, please call your primary care clinic.  If you do not have a primary care provider, please call [phone] and they will assist you.        Care EveryWhere ID     This is your Care EveryWhere ID. This could be used by other organizations to access your [place] medical records  [id number]        Your Vitals Were     Pulse Temperature Last Period Pulse Oximetry BMI (Body Mass Index)       93 97.5  F (36.4  C) (Oral) 06/10/2016 (Exact Date) 95% 28.2 kg/m2        Blood Pressure from Last 3 Encounters:   03/06/17 121/67   02/27/17 114/59   02/20/17 127/70    Weight from Last 3 Encounters:   03/06/17 163 lb (73.9 kg)   02/27/17 161 lb (73 kg)   02/20/17 160 lb (72.6 kg)              Today, you had the following     No orders found for display       Primary Care Provider Office Phone # Fax #    Valeria Ward [phone]       [facility] [address] [address] [facility] [address]        Thank you!     Thank you for choosing [facility]  for your care. Our goal is always to provide you with excellent care. Hearing back from our patients is one way we can continue to improve our services. Please take a few minutes to complete the written survey that you may receive in the mail after your visit with us. Thank you!             Your Updated Medication List - Protect others around you: Learn how to safely use, store and throw away your medicines at www.disposemymeds.org.          This list is  accurate as of: 3/6/17 12:12 PM.  Always use your most recent med list.                   Brand Name Dispense Instructions for use    PRENATAL VITAMIN PO      Take 1 tablet by mouth daily

## 2017-03-10 ENCOUNTER — TRANSFERRED RECORDS (OUTPATIENT)
Dept: HEALTH INFORMATION MANAGEMENT | Facility: CLINIC | Age: 22
End: 2017-03-10

## 2017-03-16 ENCOUNTER — MYC MEDICAL ADVICE (OUTPATIENT)
Dept: OBGYN | Facility: CLINIC | Age: 22
End: 2017-03-16

## 2017-03-16 DIAGNOSIS — G89.18 POSTOPERATIVE PAIN: Primary | ICD-10-CM

## 2017-03-16 NOTE — TELEPHONE ENCOUNTER
"Pt stating she is completely out of Percocet and took her last pills at 1100. Pt stated she is taking Percocet scheduled every 4 hr. Pt reported taking Percocet 2 tabs at 8 pm, 1200 midnight, 4 am, 8 am and then takes Percocet 1 tab at 12 noon and 4 pm. Pt stated if she takes Percocet her pain is 4-5/10 and \"not unbearable\" but if she tries IBP then pain is 7/10. Pt stated she has only taken  mg on three separate occasions since she was discharged. She has not been attempting to alternate Percocet with IBP because \"that is what I did with my 2 other C-sections.\" Explained she should be trying to wean off Percocet and the best way is start alternating.  Explained due to time of day and needing to  hard copy of Percocet at the clinic do not think she will be able to get Rx today. Pt stated she is okay to wait for reply tomorrow.     Will route to Dr. Agbeh for review & orders. Edwige Wang RN, BAN             "

## 2017-03-16 NOTE — TELEPHONE ENCOUNTER
Pt had  on 03-10-17   oxyCODONE-acetaminophen (PERCOCET) 5-325 mg oral tablet   Take 1-2 tablets by mouth every 4 (four) hours as needed for Pain.   Dispense: 30 tablet     Refills: 0       Noted pt does not have PP appt yet.    Attempted to reach pt to get more information about her pain and pain med use. Did not understand greeting on voicemail so not sure if phone number is correct. Left generic msg requesting pt call back to clinic. Left call back number & RN hrs.     Since end of day, will route to Dr. Agbeh in case he is able to give RF without more information. Edwige Wang, RN, BAN

## 2017-03-17 RX ORDER — OXYCODONE AND ACETAMINOPHEN 5; 325 MG/1; MG/1
1 TABLET ORAL EVERY 6 HOURS PRN
Qty: 20 TABLET | Refills: 0 | Status: SHIPPED | OUTPATIENT
Start: 2017-03-17 | End: 2017-04-24

## 2017-04-24 ENCOUNTER — PRENATAL OFFICE VISIT (OUTPATIENT)
Dept: FAMILY MEDICINE | Facility: CLINIC | Age: 22
End: 2017-04-24
Payer: COMMERCIAL

## 2017-04-24 VITALS
TEMPERATURE: 97.7 F | WEIGHT: 144 LBS | HEART RATE: 87 BPM | DIASTOLIC BLOOD PRESSURE: 53 MMHG | SYSTOLIC BLOOD PRESSURE: 112 MMHG | BODY MASS INDEX: 24.59 KG/M2 | HEIGHT: 64 IN

## 2017-04-24 PROCEDURE — 99207 ZZC POST PARTUM EXAM: CPT | Performed by: FAMILY MEDICINE

## 2017-04-24 PROCEDURE — 87491 CHLMYD TRACH DNA AMP PROBE: CPT | Performed by: FAMILY MEDICINE

## 2017-04-24 PROCEDURE — G0145 SCR C/V CYTO,THINLAYER,RESCR: HCPCS | Performed by: FAMILY MEDICINE

## 2017-04-24 RX ORDER — NORGESTIMATE AND ETHINYL ESTRADIOL 0.25-0.035
1 KIT ORAL DAILY
Qty: 84 TABLET | Refills: 1 | Status: SHIPPED | OUTPATIENT
Start: 2017-04-24 | End: 2017-10-06

## 2017-04-24 NOTE — MR AVS SNAPSHOT
"              After Visit Summary   4/24/2017    Lu Syed    MRN: [id number]           Patient Information     Date Of Birth          1995        Visit Information        Provider Department      4/24/2017 1:40 PM Valeria Ward MD [facility]        Today's Diagnoses     Routine postpartum follow-up    -  1       Follow-ups after your visit        Who to contact     If you have questions or need follow up information about today's clinic visit or your schedule please contact [facility] directly at [phone].  Normal or non-critical lab and imaging results will be communicated to you by Repliconhart, letter or phone within 4 business days after the clinic has received the results. If you do not hear from us within 7 days, please contact the clinic through Invoke Solutionst or phone. If you have a critical or abnormal lab result, we will notify you by phone as soon as possible.  Submit refill requests through NeedFeed or call your pharmacy and they will forward the refill request to us. Please allow 3 business days for your refill to be completed.          Additional Information About Your Visit        MyChart Information     NeedFeed gives you secure access to your electronic health record. If you see a primary care provider, you can also send messages to your care team and make appointments. If you have questions, please call your primary care clinic.  If you do not have a primary care provider, please call [phone] and they will assist you.        Care EveryWhere ID     This is your Care EveryWhere ID. This could be used by other organizations to access your [place] medical records  [id number]        Your Vitals Were     Pulse Temperature Height Last Period Breastfeeding? BMI (Body Mass Index)    87 97.7  F (36.5  C) (Oral) 5' 3.75\" (1.619 m) 06/10/2016 (Exact Date) No 24.91 kg/m2       Blood Pressure from Last 3 Encounters:   04/24/17 112/53   03/06/17 121/67   02/27/17 " 114/59    Weight from Last 3 Encounters:   04/24/17 144 lb (65.3 kg)   03/06/17 163 lb (73.9 kg)   02/27/17 161 lb (73 kg)              We Performed the Following     Chlamydia trachomatis PCR     Pap imaged thin layer screen only - recommended age 21 - 24 years          Today's Medication Changes          These changes are accurate as of: 4/24/17  2:16 PM.  If you have any questions, ask your nurse or doctor.               Start taking these medicines.        Dose/Directions    norgestimate-ethinyl estradiol 0.25-35 MG-MCG per tablet   Commonly known as:  ORTHO-CYCLEN, SPRINTEC   Used for:  Routine postpartum follow-up   Started by:  Valeria Ward MD        Dose:  1 tablet   Take 1 tablet by mouth daily   Quantity:  84 tablet   Refills:  1            Where to get your medicines      These medications were sent to [facility]/pharmacy #[id number] - [place], [place] - [address]   [address] [facility] [address] , [place] [address]     Phone:  [phone]     norgestimate-ethinyl estradiol 0.25-35 MG-MCG per tablet                Primary Care Provider Office Phone # Fax #    Valeria Ward [phone]       [facility] [address] [facility] [address]        Thank you!     Thank you for choosing [facility]  for your care. Our goal is always to provide you with excellent care. Hearing back from our patients is one way we can continue to improve our services. Please take a few minutes to complete the written survey that you may receive in the mail after your visit with us. Thank you!             Your Updated Medication List - Protect others around you: Learn how to safely use, store and throw away your medicines at www.disposemymeds.org.          This list is accurate as of: 4/24/17  2:16 PM.  Always use your most recent med list.                   Brand Name Dispense Instructions for use    norgestimate-ethinyl estradiol 0.25-35 MG-MCG per tablet     ORTHO-CYCLEN, SPRINTEC    84 tablet    Take 1 tablet by mouth daily

## 2017-04-24 NOTE — NURSING NOTE
"Chief Complaint   Patient presents with     Post Partum Exam       Initial /53  Pulse 87  Temp 97.7  F (36.5  C) (Oral)  Ht 5' 3.75\" (1.619 m)  Wt 144 lb (65.3 kg)  LMP 06/10/2016 (Exact Date)  Breastfeeding? No  BMI 24.91 kg/m2 Estimated body mass index is 24.91 kg/(m^2) as calculated from the following:    Height as of this encounter: 5' 3.75\" (1.619 m).    Weight as of this encounter: 144 lb (65.3 kg).  Medication Reconciliation: complete  Usha Lester , KRISS     "

## 2017-04-24 NOTE — PROGRESS NOTES
SUBJECTIVE: Lu is here for a 6-week postpartum checkup.    Delivery date was 3/10/17. She had a repeat c/s of a viable boy, weight 7 pounds 4 oz., with no complications.  Since delivery, she has not been breast feeding.  She has no signs of infection, bleeding or other complications.  She is not pregnant.  We discussed contraceptions and she has chosen oral contraceptives.  She  has not had intercourse since delivery and complains of No discomfort. Patient screened for postpartum depression and complaints are NEGATIVE. Screening has also been completed for intimate partner violence.    EXAM:  Today's Depression Rating was   PHQ-9 SCORE 4/24/2017   Total Score -   Total Score 0       GENERAL healthy, alert and no distress  GYN PELVIC: normal external genitalia, normal urethral meatus, normal vaginal mucosa, normal cervix, normal adnexa, no masses or tenderness and uterus normal size and shape  MS: Extremities normal. No deformaties, edema or skin discoloration.  SKIN: no ulcers, lesions or rash  PSYCH: normal affect, normal cognition    ASSESSMENT:   Normal postpartum exam after repeat c/s.    PLAN:  Return as needed or at time of next expected pap, pelvic, or breast exam.  Start ocp today, one daily, had breakthrough bleeding on 30 mcg pill, will change to 35 mcg.

## 2017-04-25 LAB
C TRACH DNA SPEC QL NAA+PROBE: NORMAL
SPECIMEN SOURCE: NORMAL

## 2017-04-25 ASSESSMENT — PATIENT HEALTH QUESTIONNAIRE - PHQ9: SUM OF ALL RESPONSES TO PHQ QUESTIONS 1-9: 0

## 2017-04-26 LAB
COPATH REPORT: NORMAL
PAP: NORMAL

## 2017-05-02 PROBLEM — R87.610 ATYPICAL SQUAMOUS CELL CHANGES OF UNDETERMINED SIGNIFICANCE (ASCUS) ON CERVICAL CYTOLOGY WITH NEGATIVE HIGH RISK HUMAN PAPILLOMA VIRUS (HPV) TEST RESULT: Status: ACTIVE | Noted: 2017-05-02

## 2017-05-03 PROBLEM — R87.610 ATYPICAL SQUAMOUS CELL CHANGES OF UNDETERMINED SIGNIFICANCE (ASCUS) ON CERVICAL CYTOLOGY WITH NEGATIVE HIGH RISK HUMAN PAPILLOMA VIRUS (HPV) TEST RESULT: Status: RESOLVED | Noted: 2017-05-02 | Resolved: 2017-05-03

## 2017-10-06 RX ORDER — NORGESTIMATE AND ETHINYL ESTRADIOL 0.25-0.035
KIT ORAL
Qty: 84 TABLET | Refills: 0 | Status: SHIPPED | OUTPATIENT
Start: 2017-10-06 | End: 2018-01-02

## 2017-10-23 NOTE — PROGRESS NOTES

## 2017-10-25 ENCOUNTER — ALLIED HEALTH/NURSE VISIT (OUTPATIENT)
Dept: NURSING | Facility: CLINIC | Age: 22
End: 2017-10-25
Payer: COMMERCIAL

## 2017-10-25 DIAGNOSIS — Z23 NEED FOR PROPHYLACTIC VACCINATION AND INOCULATION AGAINST INFLUENZA: Primary | ICD-10-CM

## 2017-10-25 PROCEDURE — 90686 IIV4 VACC NO PRSV 0.5 ML IM: CPT

## 2017-10-25 PROCEDURE — 90471 IMMUNIZATION ADMIN: CPT

## 2017-10-25 PROCEDURE — 99207 ZZC NO CHARGE NURSE ONLY: CPT

## 2017-10-25 NOTE — MR AVS SNAPSHOT
After Visit Summary   10/25/2017    Lu Syed    MRN: [id number]           Patient Information     Date Of Birth          1995        Visit Information        Provider Department      10/25/2017 1:00 PM AN FLU CLINIC [facility]        Today's Diagnoses     Need for prophylactic vaccination and inoculation against influenza    -  1       Follow-ups after your visit        Who to contact     If you have questions or need follow up information about today's clinic visit or your schedule please contact [facility] directly at [phone].  Normal or non-critical lab and imaging results will be communicated to you by Freebeepayhart, letter or phone within 4 business days after the clinic has received the results. If you do not hear from us within 7 days, please contact the clinic through MuckRockt or phone. If you have a critical or abnormal lab result, we will notify you by phone as soon as possible.  Submit refill requests through SupplyBetter or call your pharmacy and they will forward the refill request to us. Please allow 3 business days for your refill to be completed.          Additional Information About Your Visit        MyChart Information     SupplyBetter gives you secure access to your electronic health record. If you see a primary care provider, you can also send messages to your care team and make appointments. If you have questions, please call your primary care clinic.  If you do not have a primary care provider, please call [phone] and they will assist you.        Care EveryWhere ID     This is your Care EveryWhere ID. This could be used by other organizations to access your [place] medical records  [id number]         Blood Pressure from Last 3 Encounters:   04/24/17 112/53   03/06/17 121/67   02/27/17 114/59    Weight from Last 3 Encounters:   04/24/17 144 lb (65.3 kg)   03/06/17 163 lb (73.9 kg)   02/27/17 161 lb (73 kg)              We Performed the  Following     FLU VAC, SPLIT VIRUS IM > 3 YO (QUADRIVALENT) [98489]     Vaccine Administration, Initial [55422]        Primary Care Provider Office Phone # Fax #    Valeria Ward [phone] [address] [facility] [address] [address]        Equal Access to Services     [facility] : Hadii aad ku hadasho Soomaali, waaxda luqadaha, qaybta kaalmada adeegyada, waxay allysonin hayaan adejaylen metzgerjairondoug holliday . So [facility] [phone].    ATENCIÓN: Si habla español, tiene a melchor disposición servicios gratuitos de asistencia lingüística. Llame al [phone].    We comply with applicable federal civil rights laws and [place] laws. We do not discriminate on the basis of race, color, national origin, age, disability, sex, sexual orientation, or gender identity.            Thank you!     Thank you for choosing [facility]  for your care. Our goal is always to provide you with excellent care. Hearing back from our patients is one way we can continue to improve our services. Please take a few minutes to complete the written survey that you may receive in the mail after your visit with us. Thank you!             Your Updated Medication List - Protect others around you: Learn how to safely use, store and throw away your medicines at www.disposemymeds.org.          This list is accurate as of: 10/25/17  1:11 PM.  Always use your most recent med list.                   Brand Name Dispense Instructions for use Diagnosis    SPRINTEC 28 0.25-35 MG-MCG per tablet   Generic drug:  norgestimate-ethinyl estradiol     84 tablet    TAKE 1 TABLET BY MOUTH DAILY    Routine postpartum follow-up

## 2018-01-04 RX ORDER — NORGESTIMATE AND ETHINYL ESTRADIOL 0.25-0.035
KIT ORAL
Qty: 84 TABLET | Refills: 0 | Status: SHIPPED | OUTPATIENT
Start: 2018-01-04 | End: 2018-04-04

## 2018-04-04 RX ORDER — NORGESTIMATE AND ETHINYL ESTRADIOL 0.25-0.035
KIT ORAL
Qty: 28 TABLET | Refills: 0 | Status: SHIPPED | OUTPATIENT
Start: 2018-04-04 | End: 2018-04-11

## 2018-04-04 NOTE — LETTER
April 4, 2018    Lu Syed  [address]  [place] [address]    Dear Lu,       We recently received a refill request for SPRINTEC 28 0.25-35 MG-MCG per tablet.  We have refilled this for one time only because you are due for a:    Physical office visit      Please call at your earliest convenience so that there will not be a delay with your future refills.          Thank you,   Your [facility] Team/  [phone]

## 2018-04-04 NOTE — TELEPHONE ENCOUNTER
2 month only refilled as patient is due for physical.    TC, patient due for:  VIVIANA Blake RN

## 2018-04-11 ENCOUNTER — OFFICE VISIT (OUTPATIENT)
Dept: FAMILY MEDICINE | Facility: CLINIC | Age: 23
End: 2018-04-11
Payer: COMMERCIAL

## 2018-04-11 VITALS
SYSTOLIC BLOOD PRESSURE: 108 MMHG | OXYGEN SATURATION: 99 % | HEART RATE: 81 BPM | DIASTOLIC BLOOD PRESSURE: 62 MMHG | RESPIRATION RATE: 16 BRPM | TEMPERATURE: 99 F | HEIGHT: 64 IN | BODY MASS INDEX: 21.17 KG/M2 | WEIGHT: 124 LBS

## 2018-04-11 DIAGNOSIS — Z00.00 ROUTINE GENERAL MEDICAL EXAMINATION AT A HEALTH CARE FACILITY: Primary | ICD-10-CM

## 2018-04-11 DIAGNOSIS — Z30.41 ENCOUNTER FOR SURVEILLANCE OF CONTRACEPTIVE PILLS: ICD-10-CM

## 2018-04-11 PROCEDURE — 99395 PREV VISIT EST AGE 18-39: CPT | Performed by: PHYSICIAN ASSISTANT

## 2018-04-11 RX ORDER — NORGESTIMATE AND ETHINYL ESTRADIOL 0.25-0.035
1 KIT ORAL DAILY
Qty: 84 TABLET | Refills: 3 | Status: SHIPPED | OUTPATIENT
Start: 2018-04-11 | End: 2019-02-06

## 2018-04-11 ASSESSMENT — PAIN SCALES - GENERAL: PAINLEVEL: NO PAIN (0)

## 2018-04-11 NOTE — NURSING NOTE
"Chief Complaint   Patient presents with     Physical     Health Maintenance     HPV9       Initial /62  Pulse 81  Temp 99  F (37.2  C) (Oral)  Resp 16  Ht 5' 3.5\" (1.613 m)  Wt 124 lb (56.2 kg)  LMP 03/26/2018  SpO2 99%  BMI 21.62 kg/m2 Estimated body mass index is 21.62 kg/(m^2) as calculated from the following:    Height as of this encounter: 5' 3.5\" (1.613 m).    Weight as of this encounter: 124 lb (56.2 kg).  Medication Reconciliation: complete    YIFAN Cho MA    "

## 2018-04-11 NOTE — MR AVS SNAPSHOT
After Visit Summary   4/11/2018    Lu Syed    MRN: [id number]           Patient Information     Date Of Birth          1995        Visit Information        Provider Department      4/11/2018 9:20 AM Kehr, Kristen M, PA-C [facility]        Today's Diagnoses     Routine general medical examination at a health care facility    -  1    Encounter for surveillance of contraceptive pills          Care Instructions      Preventive Health Recommendations  Female Ages 18 to 25     Yearly exam:     See your health care provider every year in order to  o Review health changes.   o Discuss preventive care.    o Review your medicines if your doctor has prescribed any.      You should be tested each year for STDs (sexually transmitted diseases).       After age 20, talk to your provider about how often you should have cholesterol testing.      Starting at age 21, get a Pap test every three years. If you have an abnormal result, your doctor may have you test more often.      If you are at risk for diabetes, you should have a diabetes test (fasting glucose).     Shots:     Get a flu shot each year.     Get a tetanus shot every 10 years.     Consider getting the shot (vaccine) that prevents cervical cancer (Gardasil).    Nutrition:     Eat at least 5 servings of fruits and vegetables each day.    Eat whole-grain bread, whole-wheat pasta and brown rice instead of white grains and rice.    Talk to your provider about Calcium and Vitamin D.     Lifestyle    Exercise at least 150 minutes a week each week (30 minutes a day, 5 days a week). This will help you control your weight and prevent disease.    Limit alcohol to one drink per day.    No smoking.     Wear sunscreen to prevent skin cancer.    See your dentist every six months for an exam and cleaning.          Follow-ups after your visit        Who to contact     If you have questions or need follow up information about today's clinic visit  "or your schedule please contact [facility] directly at [phone].  Normal or non-critical lab and imaging results will be communicated to you by MyChart, letter or phone within 4 business days after the clinic has received the results. If you do not hear from us within 7 days, please contact the clinic through GoldKey Resourceshart or phone. If you have a critical or abnormal lab result, we will notify you by phone as soon as possible.  Submit refill requests through Mailsuite or call your pharmacy and they will forward the refill request to us. Please allow 3 business days for your refill to be completed.          Additional Information About Your Visit        GoldKey ResourcesharMDxHealth Information     Mailsuite gives you secure access to your electronic health record. If you see a primary care provider, you can also send messages to your care team and make appointments. If you have questions, please call your primary care clinic.  If you do not have a primary care provider, please call [phone] and they will assist you.        Care EveryWhere ID     This is your Care EveryWhere ID. This could be used by other organizations to access your [place] medical records  [id number]        Your Vitals Were     Pulse Temperature Respirations Height Last Period Pulse Oximetry    81 99  F (37.2  C) (Oral) 16 5' 3.5\" (1.613 m) 03/26/2018 99%    BMI (Body Mass Index)                   21.62 kg/m2            Blood Pressure from Last 3 Encounters:   04/11/18 108/62   04/24/17 112/53   03/06/17 121/67    Weight from Last 3 Encounters:   04/11/18 124 lb (56.2 kg)   04/24/17 144 lb (65.3 kg)   03/06/17 163 lb (73.9 kg)              Today, you had the following     No orders found for display         Today's Medication Changes          These changes are accurate as of 4/11/18  9:41 AM.  If you have any questions, ask your nurse or doctor.               These medicines have changed or have updated prescriptions.        Dose/Directions    " norgestimate-ethinyl estradiol 0.25-35 MG-MCG per tablet   Commonly known as:  SPRINTEC 28   This may have changed:  See the new instructions.   Used for:  Encounter for surveillance of contraceptive pills   Changed by:  Kehr, Kristen M, PA-C        Dose:  1 tablet   Take 1 tablet by mouth daily   Quantity:  84 tablet   Refills:  3            Where to get your medicines      These medications were sent to [id number] [place] - [address]   [address] , [place] [facility] [address]     Phone:  [phone]     norgestimate-ethinyl estradiol 0.25-35 MG-MCG per tablet                Primary Care Provider Office Phone # Fax #    Valeria Ward [phone] [address] [facility] [address]        Equal Access to Services     [facility]: Hadii jil kessler hadasho Soomaali, waaxda luqadaha, qaybta kaalmada adeegyada, waxay allysonin hayjoon holliday . So [facility] [phone].    ATENCIÓN: Si habla español, tiene a melchor disposición servicios gratuitos de asistencia lingüística. Llame al [phone].    We comply with applicable federal civil rights laws and [place] laws. We do not discriminate on the basis of race, color, national origin, age, disability, sex, sexual orientation, or gender identity.            Thank you!     Thank you for choosing [facility]  for your care. Our goal is always to provide you with excellent care. Hearing back from our patients is one way we can continue to improve our services. Please take a few minutes to complete the written survey that you may receive in the mail after your visit with us. Thank you!             Your Updated Medication List - Protect others around you: Learn how to safely use, store and throw away your medicines at www.disposemymeds.org.          This list is accurate as of 4/11/18  9:41 AM.  Always use your most recent med list.                   Brand Name Dispense Instructions for  use Diagnosis    norgestimate-ethinyl estradiol 0.25-35 MG-MCG per tablet    SPRINTEC 28    84 tablet    Take 1 tablet by mouth daily    Encounter for surveillance of contraceptive pills

## 2018-04-11 NOTE — PROGRESS NOTES
SUBJECTIVE:   CC: Lu Syed is an 23 year old woman who presents for preventive health visit.     Physical   Annual:     Getting at least 3 servings of Calcium per day::  Yes    Bi-annual eye exam::  NO    Dental care twice a year::  Yes    Sleep apnea or symptoms of sleep apnea::  None    Diet::  Regular (no restrictions)    Frequency of exercise::  1 day/week    Duration of exercise::  15-30 minutes    Additional concerns today::  YES                Check possible mole on left upper thigh.    Today's PHQ-2 Score:   PHQ-2 (  [employer]) 2018   Q1: Little interest or pleasure in doing things 0   Q2: Feeling down, depressed or hopeless 0   PHQ-2 Score 0   Q1: Little interest or pleasure in doing things Not at all   Q2: Feeling down, depressed or hopeless Not at all   PHQ-2 Score 0       Abuse: Current or Past(Physical, Sexual or Emotional)- No  Do you feel safe in your environment - Yes    Social History   Substance Use Topics     Smoking status: Never Smoker     Smokeless tobacco: Never Used     Alcohol use No     Alcohol Use 2018   If you drink alcohol do you typically have greater than 3 drinks per day OR greater than 7 drinks per week? No   No flowsheet data found.    Reviewed orders with patient.  Reviewed health maintenance and updated orders accordingly - Yes  BP Readings from Last 3 Encounters:   18 108/62   17 112/53   17 121/67    Wt Readings from Last 3 Encounters:   18 124 lb (56.2 kg)   17 144 lb (65.3 kg)   17 163 lb (73.9 kg)                  Patient Active Problem List   Diagnosis     Encounter for supervision of other normal pregnancy     Previous  delivery, antepartum condition or complication     Past Surgical History:   Procedure Laterality Date      SECTION  , 2015    x 2       Social History   Substance Use Topics     Smoking status: Never Smoker     Smokeless tobacco: Never Used     Alcohol use No     History  "reviewed. No pertinent family history.      Current Outpatient Prescriptions   Medication Sig Dispense Refill     norgestimate-ethinyl estradiol (SPRINTEC 28) 0.25-35 MG-MCG per tablet Take 1 tablet by mouth daily 84 tablet 3     [DISCONTINUED] SPRINTEC 28 0.25-35 MG-MCG per tablet TAKE 1 TABLET BY MOUTH DAILY 28 tablet 0       Mammogram not appropriate for this patient based on age.    Pertinent mammograms are reviewed under the imaging tab.  History of abnormal Pap smear:   NO - age 30- 65 PAP every 3 years recommended  Last 3 Pap Results:   PAP (no units)   Date Value   2017 NIL       Reviewed and updated as needed this visit by clinical staff  Tobacco  Allergies  Meds  Med Hx  Surg Hx  Fam Hx  Soc Hx        Reviewed and updated as needed this visit by Provider        History reviewed. No pertinent past medical history.   Past Surgical History:   Procedure Laterality Date      SECTION  , 2015    x 2       Review of Systems  C: NEGATIVE for fever, chills, change in weight  I: NEGATIVE for worrisome rashes, she has a red mole on her left thigh she thinks is getting more raised.   E: NEGATIVE for vision changes or irritation  ENT: NEGATIVE for ear, mouth and throat problems  R: NEGATIVE for significant cough or SOB  B: NEGATIVE for masses, tenderness or discharge  CV: NEGATIVE for chest pain, palpitations or peripheral edema  GI: NEGATIVE for nausea, abdominal pain, heartburn, or change in bowel habits  : NEGATIVE for unusual urinary or vaginal symptoms. Periods are regular.  M: NEGATIVE for significant arthralgias or myalgia  N: NEGATIVE for weakness, dizziness or paresthesias  E: NEGATIVE for temperature intolerance, skin/hair changes  P: NEGATIVE for changes in mood or affect     OBJECTIVE:   /62  Pulse 81  Temp 99  F (37.2  C) (Oral)  Resp 16  Ht 5' 3.5\" (1.613 m)  Wt 124 lb (56.2 kg)  LMP 2018  SpO2 99%  BMI 21.62 kg/m2  Physical Exam  GENERAL: healthy, alert and no " "distress  EYES: Eyes grossly normal to inspection, PERRL and conjunctivae and sclerae normal  HENT: ear canals and TM's normal, nose and mouth without ulcers or lesions  NECK: no adenopathy, no asymmetry, masses, or scars and thyroid normal to palpation  RESP: lungs clear to auscultation - no rales, rhonchi or wheezes  BREAST: normal without masses, tenderness or nipple discharge and no palpable axillary masses or adenopathy  CV: regular rate and rhythm, normal S1 S2, no S3 or S4, no murmur, click or rub, no peripheral edema and peripheral pulses strong  ABDOMEN: soft, nontender, no hepatosplenomegaly, no masses and bowel sounds normal  MS: no gross musculoskeletal defects noted, no edema  SKIN: red regular shaped mole on the left anterior thigh, appear benign  NEURO: Normal strength and tone, mentation intact and speech normal  PSYCH: mentation appears normal, affect normal/bright    ASSESSMENT/PLAN:   1. Routine general medical examination at a health care facility  Health maintenance reviewed and updated.    2. Encounter for surveillance of contraceptive pills  Refills given  - norgestimate-ethinyl estradiol (SPRINTEC 28) 0.25-35 MG-MCG per tablet; Take 1 tablet by mouth daily  Dispense: 84 tablet; Refill: 3    COUNSELING:  Reviewed preventive health counseling, as reflected in patient instructions       Regular exercise       Healthy diet/nutrition       Contraception       reports that she has never smoked. She has never used smokeless tobacco.    Estimated body mass index is 21.62 kg/(m^2) as calculated from the following:    Height as of this encounter: 5' 3.5\" (1.613 m).    Weight as of this encounter: 124 lb (56.2 kg).       Counseling Resources:  ATP IV Guidelines  Pooled Cohorts Equation Calculator  Breast Cancer Risk Calculator  FRAX Risk Assessment  ICSI Preventive Guidelines  Dietary Guidelines for Americans, 2010  [employer]'s MyPlate  ASA Prophylaxis  Lung CA Screening    Kristen M. Kehr, PA-C [facility] " [facility]

## 2018-12-12 ENCOUNTER — OFFICE VISIT (OUTPATIENT)
Dept: OBGYN | Facility: CLINIC | Age: 23
End: 2018-12-12
Payer: COMMERCIAL

## 2018-12-12 VITALS
HEART RATE: 72 BPM | BODY MASS INDEX: 23.93 KG/M2 | SYSTOLIC BLOOD PRESSURE: 114 MMHG | DIASTOLIC BLOOD PRESSURE: 58 MMHG | WEIGHT: 137.25 LBS

## 2018-12-12 DIAGNOSIS — Z32.02 PREGNANCY EXAMINATION OR TEST, NEGATIVE RESULT: ICD-10-CM

## 2018-12-12 DIAGNOSIS — Z32.01 PREGNANCY TEST POSITIVE: ICD-10-CM

## 2018-12-12 DIAGNOSIS — N91.2 AMENORRHEA: Primary | ICD-10-CM

## 2018-12-12 LAB
B-HCG SERPL-ACNC: 93 IU/L (ref 0–5)
BETA HCG QUAL IFA URINE: NEGATIVE

## 2018-12-12 PROCEDURE — 84703 CHORIONIC GONADOTROPIN ASSAY: CPT | Performed by: ADVANCED PRACTICE MIDWIFE

## 2018-12-12 PROCEDURE — 99213 OFFICE O/P EST LOW 20 MIN: CPT | Performed by: ADVANCED PRACTICE MIDWIFE

## 2018-12-12 PROCEDURE — 84702 CHORIONIC GONADOTROPIN TEST: CPT | Performed by: ADVANCED PRACTICE MIDWIFE

## 2018-12-12 PROCEDURE — 36415 COLL VENOUS BLD VENIPUNCTURE: CPT | Performed by: ADVANCED PRACTICE MIDWIFE

## 2018-12-12 NOTE — PROGRESS NOTES
Lu Syed is a 23 year old who presents to the clinic for confirmation of pregnancy.   No LMP recorded.  Thinks that her LMP ended on 10/30.  Not certain when it started.  Usually has a 3-7 day period  Normally menses are every 28 days  First positive pregnancy test at home was 2018  Has not skipped or missed a period without being pregnant.   Has not been taking her pills regularly.         Patient Active Problem List   Diagnosis     Encounter for supervision of other normal pregnancy     Previous  delivery, antepartum condition or complication     No past medical history on file.  Past Surgical History:   Procedure Laterality Date      SECTION  , 2015    x 2     Current Outpatient Medications   Medication Sig Dispense Refill     norgestimate-ethinyl estradiol (SPRINTEC 28) 0.25-35 MG-MCG per tablet Take 1 tablet by mouth daily 84 tablet 3     No Known Allergies  Social History     Socioeconomic History     Marital status:      Spouse name: Not on file     Number of children: Not on file     Years of education: Not on file     Highest education level: Not on file   Social Needs     Financial resource strain: Not on file     Food insecurity - worry: Not on file     Food insecurity - inability: Not on file     Transportation needs - medical: Not on file     Transportation needs - non-medical: Not on file   Occupational History     Not on file   Tobacco Use     Smoking status: Never Smoker     Smokeless tobacco: Never Used   Substance and Sexual Activity     Alcohol use: No     Drug use: No     Sexual activity: Yes     Partners: Male   Other Topics Concern     Parent/sibling w/ CABG, MI or angioplasty before 65F 55M? Not Asked   Social History Narrative     Not on file     No family history on file.     ROS: 10 point ROS neg other than the symptoms noted above in the HPI.          There were no vitals taken for this visit.        UPT today is  negative    ASSESSMENT:        (N91.2) Amenorrhea  (primary encounter diagnosis)  Comment:   Plan: Beta HCG Qual, Urine - [facility] and [place]         ([id number])                Discussed reasons why her UPT could be negative her and positive at home -- dilute urine sample or chemical pregnancy. Will plan to do quant and follow up based on results.   If she has a negative quant and no menses for 4-6 months RTC for testing.   If quant is negative and wants to avoid pregnancy may restart pill.

## 2018-12-13 ENCOUNTER — TELEPHONE (OUTPATIENT)
Dept: FAMILY MEDICINE | Facility: CLINIC | Age: 23
End: 2018-12-13
Payer: COMMERCIAL

## 2018-12-13 ENCOUNTER — TELEPHONE (OUTPATIENT)
Dept: OBGYN | Facility: CLINIC | Age: 23
End: 2018-12-13

## 2018-12-13 NOTE — TELEPHONE ENCOUNTER
Patient is calling stated that she has a positive OB test and would like to see Dr. Ward for OB.   Please call to set up an appointment with provider.  Please call to advise  Thank you

## 2018-12-13 NOTE — TELEPHONE ENCOUNTER
Please call patient with lab results for pregnancy test.    Thank you       Call placed. Plan discussed.   RANDI Tee, CNM

## 2018-12-14 DIAGNOSIS — N91.2 AMENORRHEA: ICD-10-CM

## 2018-12-14 LAB — B-HCG SERPL-ACNC: 244 IU/L (ref 0–5)

## 2018-12-14 PROCEDURE — 36415 COLL VENOUS BLD VENIPUNCTURE: CPT | Performed by: ADVANCED PRACTICE MIDWIFE

## 2018-12-14 PROCEDURE — 84702 CHORIONIC GONADOTROPIN TEST: CPT | Performed by: ADVANCED PRACTICE MIDWIFE

## 2018-12-14 NOTE — TELEPHONE ENCOUNTER
Patient wondering if MD would be willing to order U/S to determine dates. Not sure of last menstrual and when to schedule OB appointment. Patient notified MD out of office until 12-18-18 and states understanding./Rosita Suggs,

## 2018-12-17 NOTE — TELEPHONE ENCOUNTER
Chart states LMP ended 10/30/18 seen Rena Hunt 12/12/18 for confirmation. Called Patient and she stated Rena Hunt said that the Labs do not add up for her last ending of period on 10/30/18. So she does not know how far along she is for sure. Please advise.  BELÉN Guajardo

## 2018-12-19 NOTE — TELEPHONE ENCOUNTER
Appears US ordered by Rena Ross already.  Per her plan, was to get this in about 3-4 weeks.  Too early to get US now as it will not help us date her pregnancy.  Suspect she is very early in pregnancy.    Once we have results of US will be able to get her scheduled for her OB physical.    No appt for US scheduled yet. Please let pt know she needs to schedule this in about 3 weeks.

## 2018-12-19 NOTE — TELEPHONE ENCOUNTER
Called and informed patient provider would like U/S done in three weeks gave phone number to call. Will call patient back once determined how far along patient is in pregnancy.  BELÉN Guajardo

## 2019-01-09 ENCOUNTER — ANCILLARY PROCEDURE (OUTPATIENT)
Dept: ULTRASOUND IMAGING | Facility: CLINIC | Age: 24
End: 2019-01-09
Attending: ADVANCED PRACTICE MIDWIFE
Payer: COMMERCIAL

## 2019-01-09 DIAGNOSIS — Z32.01 PREGNANCY TEST POSITIVE: ICD-10-CM

## 2019-01-09 PROCEDURE — 76801 OB US < 14 WKS SINGLE FETUS: CPT

## 2019-02-06 ENCOUNTER — PRENATAL OFFICE VISIT (OUTPATIENT)
Dept: FAMILY MEDICINE | Facility: CLINIC | Age: 24
End: 2019-02-06
Payer: COMMERCIAL

## 2019-02-06 VITALS
BODY MASS INDEX: 24.38 KG/M2 | TEMPERATURE: 98.7 F | HEART RATE: 102 BPM | DIASTOLIC BLOOD PRESSURE: 70 MMHG | WEIGHT: 139.8 LBS | RESPIRATION RATE: 16 BRPM | SYSTOLIC BLOOD PRESSURE: 114 MMHG

## 2019-02-06 DIAGNOSIS — Z34.81 ENCOUNTER FOR SUPERVISION OF OTHER NORMAL PREGNANCY IN FIRST TRIMESTER: Primary | ICD-10-CM

## 2019-02-06 DIAGNOSIS — O34.219 PREVIOUS CESAREAN DELIVERY, ANTEPARTUM CONDITION OR COMPLICATION: ICD-10-CM

## 2019-02-06 LAB
ERYTHROCYTE [DISTWIDTH] IN BLOOD BY AUTOMATED COUNT: 12.8 % (ref 10–15)
HCT VFR BLD AUTO: 35 % (ref 35–47)
HGB BLD-MCNC: 11.9 G/DL (ref 11.7–15.7)
MCH RBC QN AUTO: 29.1 PG (ref 26.5–33)
MCHC RBC AUTO-ENTMCNC: 34 G/DL (ref 31.5–36.5)
MCV RBC AUTO: 86 FL (ref 78–100)
PLATELET # BLD AUTO: 355 10E9/L (ref 150–450)
RBC # BLD AUTO: 4.09 10E12/L (ref 3.8–5.2)
TSH SERPL DL<=0.005 MIU/L-ACNC: 1.3 MU/L (ref 0.4–4)
WBC # BLD AUTO: 10.3 10E9/L (ref 4–11)

## 2019-02-06 PROCEDURE — 99207 ZZC FIRST OB VISIT: CPT | Performed by: FAMILY MEDICINE

## 2019-02-06 PROCEDURE — 85027 COMPLETE CBC AUTOMATED: CPT | Performed by: FAMILY MEDICINE

## 2019-02-06 PROCEDURE — 36415 COLL VENOUS BLD VENIPUNCTURE: CPT | Performed by: FAMILY MEDICINE

## 2019-02-06 PROCEDURE — 87086 URINE CULTURE/COLONY COUNT: CPT | Performed by: FAMILY MEDICINE

## 2019-02-06 PROCEDURE — 86850 RBC ANTIBODY SCREEN: CPT | Performed by: FAMILY MEDICINE

## 2019-02-06 PROCEDURE — 86900 BLOOD TYPING SEROLOGIC ABO: CPT | Performed by: FAMILY MEDICINE

## 2019-02-06 PROCEDURE — 86787 VARICELLA-ZOSTER ANTIBODY: CPT | Performed by: FAMILY MEDICINE

## 2019-02-06 PROCEDURE — 87491 CHLMYD TRACH DNA AMP PROBE: CPT | Performed by: FAMILY MEDICINE

## 2019-02-06 PROCEDURE — 86901 BLOOD TYPING SEROLOGIC RH(D): CPT | Performed by: FAMILY MEDICINE

## 2019-02-06 PROCEDURE — 84443 ASSAY THYROID STIM HORMONE: CPT | Performed by: FAMILY MEDICINE

## 2019-02-06 PROCEDURE — 87389 HIV-1 AG W/HIV-1&-2 AB AG IA: CPT | Performed by: FAMILY MEDICINE

## 2019-02-06 PROCEDURE — 86762 RUBELLA ANTIBODY: CPT | Performed by: FAMILY MEDICINE

## 2019-02-06 PROCEDURE — 87340 HEPATITIS B SURFACE AG IA: CPT | Performed by: FAMILY MEDICINE

## 2019-02-06 PROCEDURE — 86780 TREPONEMA PALLIDUM: CPT | Performed by: FAMILY MEDICINE

## 2019-02-06 NOTE — NURSING NOTE
"Chief Complaint   Patient presents with     Prenatal Care     estevan 8/17/19       Initial /70   Pulse 102   Temp 98.7  F (37.1  C) (Oral)   Resp 16   Wt 63.4 kg (139 lb 12.8 oz)   LMP  (LMP Unknown)   BMI 24.38 kg/m   Estimated body mass index is 24.38 kg/m  as calculated from the following:    Height as of 4/11/18: 1.613 m (5' 3.5\").    Weight as of this encounter: 63.4 kg (139 lb 12.8 oz).  Medication Reconciliation: complete  Jonathon Calderon CMA    "

## 2019-02-06 NOTE — PROGRESS NOTES
Lu Syed  is a 23 year old co-habitating who presents to the clinic for a new ob visit.    Estimated Date of Delivery: Aug 19, 2019 is calculated from 8w2d ultrasound No LMP recorded (lmp unknown). Patient is pregnant.   She has had bleeding since her LMP.  The bleeding  was bright red, in small, on one occasions, and was not accompanied by cramping. US showed subchorionic hemorrhage  She has had mild nausea. Weigh loss has not occurred.   This was a planned pregnancy.   FOB is involved,     OTHER CONCERNS: +fatigue, breast ttp.    Obstetric History       T3      L3     SAB0   TAB0   Ectopic0   Multiple0   Live Births3       # Outcome Date GA Lbr Torres/2nd Weight Sex Delivery Anes PTL Lv   4 Current            3 Term 03/10/17 39w0d  3.289 kg (7 lb 4 oz) M CS-LTranv Spinal  OSCAR   2 Term  39w0d  3.629 kg (8 lb) M CS-LTranv Spinal N OSCAR   1 Term 13 40w4d  3.175 kg (7 lb) F CS-LTranv EPI N OSCAR      Name: Kyung      Complications: Chorioamnionitis,Failure to Progress in First Stage         INFECTION HISTORY  HIV: No  Hepatitis B: No  Hepatitis C: No  Syphilis:  No  Tuberculosis: no   PPD- no  Herpes self: no  Herpes partner:  no  Chlamydia:  no  Gonorrhea:  no  HPV: no  BV:  no  Trichomonis:  no  Chicken Pox:  vaccinated  ====================================================  PERSONAL/SOCIAL HISTORY  Lives lives with their family.  Employment: Full time.  Her job involves moderate activity .  Additional items: None  =====================================================   REVIEW OF SYSTEMS  CONSTITUTIONAL: NEGATIVE for fever, chills  EYES: NEGATIVE for vision changes   RESP: NEGATIVE for significant cough or SOB  CV: NEGATIVE for chest pain, palpitations   GI: NEGATIVE for nausea, abdominal pain, heartburn, or change in bowel habits  : NEGATIVE for frequency, dysuria, or hematuria  MUSCULOSKELETAL: NEGATIVE for significant arthralgias or myalgia  NEURO: NEGATIVE for weakness, dizziness or  paresthesias or headache  ====================================================  PHYSICAL EXAM:  /70   Pulse 102   Temp 98.7  F (37.1  C) (Oral)   Resp 16   Wt 63.4 kg (139 lb 12.8 oz)   LMP  (LMP Unknown)   BMI 24.38 kg/m    BMI- Body mass index is 24.38 kg/m .,     RECOMMENDED WEIGHT GAIN: 15-25 lbs.  GENERAL:  Pleasant pregnant female, alert, well groomed.  SKIN:  Warm and dry, without lesions or rashes  HEAD: Symmetrical features.  EYES:  PERRLA,   MOUTH:  Buccal mucosa pink, moist without lesions.    NECK:  Thyroid without enlargement and nodules.  Lymph nodes not palpable. LUNGS:  Clear to auscultation.    HEART:  RRR without murmur.  ABDOMEN: Soft without masses , tenderness or organomegaly.  No CVA tenderness. Well healed scar from  section. FHT visible on limited bedside US showing single live IUP  MENTAL STATUS:  Alert, oriented x3.  Speech fluent with normal naming, repetition, comprehension.   CRANIAL NERVES:   Pupils are equal, round, reactive to light.  Extraocular movements full.  Visual fields full.  Facial sensation, movement normal.  Palate moves symmetrically.  Tongue midline.  Sternocleidomastoid and trapezius strength intact.    Motor 5/5.  Reflexes 3/4.    EXTREMITIES:  No edema. No significant varicosities.   GENITALIA:  BUS WNL, no lesions noted   VAGINA:  Pink, normal rugae and discharge normal and physiologic,   CERVIX:  smooth, without discharge or CMT and nulliparous os,   firm/ closed 3 cm long.  UTERUS: Midposition, nontender 12 weeks in size.  ADNEXA: Without masses or tenderness  PELVIS:   Adequate,  for all pregnancies.  .      =========================================  ASSESSMENT:  (Z34.81) Encounter for supervision of other normal pregnancy in first trimester  (primary encounter diagnosis)  Comment: see below  Plan: CBC with platelets, OB hemoglobin, Rubella         Antibody IgG Quantitative, Treponema Abs w         Reflex to RPR and Titer, Hepatitis B  surface         antigen, HIV Antigen Antibody Combo, Chlamydia         trachomatis PCR, Urine Culture Aerobic         Bacterial, TSH with free T4 reflex, ABO/Rh type        and screen, Varicella Zoster Virus Antibody IgG            (O34.219) Previous  delivery, antepartum condition or complication  Comment: plan repeat   Plan: to OB aqt 28 weeks for consultation    PREGNANCY AT RISK? no  ==========================================  PLAN:  Instructed on use of triage nurse line and contacting the on call provider after hours for an urgent need such as fever, vagina bleeding, bladder or vaginal infection, rupture of membranes,  or term labor.    Discussed the indications, uses for and false positives for quad screen, nuchal translucency and fetal survey ultrasound at 18-20 weeks gestation. Will inform us at the next visit if she wished to avail herself of these screens.  Instructed on best evidence for: weight gain for her BMI for pregnancy; healthy diet and foods to avoid; exercise and activity during pregnancy;avoiding exposure to toxoplasmosis; and maintenance of a generally healthy lifestyle.   Discussed the harms, benefits, side effects and alternative therapies for current prescribed and OTC medications.

## 2019-02-07 LAB
ABO + RH BLD: NORMAL
ABO + RH BLD: NORMAL
BACTERIA SPEC CULT: NO GROWTH
BLD GP AB SCN SERPL QL: NORMAL
BLOOD BANK CMNT PATIENT-IMP: NORMAL
C TRACH DNA SPEC QL NAA+PROBE: NEGATIVE
HBV SURFACE AG SERPL QL IA: NONREACTIVE
HIV 1+2 AB+HIV1 P24 AG SERPL QL IA: NONREACTIVE
RUBV IGG SERPL IA-ACNC: 11 IU/ML
SPECIMEN EXP DATE BLD: NORMAL
SPECIMEN SOURCE: NORMAL
SPECIMEN SOURCE: NORMAL
T PALLIDUM AB SER QL: NONREACTIVE
VZV IGG SER QL IA: 3.1 AI (ref 0–0.8)

## 2019-03-06 ENCOUNTER — PRENATAL OFFICE VISIT (OUTPATIENT)
Dept: FAMILY MEDICINE | Facility: CLINIC | Age: 24
End: 2019-03-06
Payer: COMMERCIAL

## 2019-03-06 VITALS
BODY MASS INDEX: 25.03 KG/M2 | TEMPERATURE: 98.2 F | RESPIRATION RATE: 16 BRPM | DIASTOLIC BLOOD PRESSURE: 80 MMHG | HEIGHT: 64 IN | HEART RATE: 83 BPM | WEIGHT: 146.6 LBS | SYSTOLIC BLOOD PRESSURE: 121 MMHG

## 2019-03-06 DIAGNOSIS — Z34.81 ENCOUNTER FOR SUPERVISION OF OTHER NORMAL PREGNANCY IN FIRST TRIMESTER: Primary | ICD-10-CM

## 2019-03-06 DIAGNOSIS — O34.219 PREVIOUS CESAREAN DELIVERY, ANTEPARTUM CONDITION OR COMPLICATION: ICD-10-CM

## 2019-03-06 PROCEDURE — 99207 ZZC PRENATAL VISIT: CPT | Performed by: FAMILY MEDICINE

## 2019-03-06 ASSESSMENT — MIFFLIN-ST. JEOR: SCORE: 1387.03

## 2019-03-06 NOTE — NURSING NOTE
"Chief Complaint   Patient presents with     Prenatal Care     question about kissing younger children        Initial /80   Pulse 83   Temp 98.2  F (36.8  C) (Oral)   Resp 16   Ht 1.613 m (5' 3.5\")   Wt 66.5 kg (146 lb 9.6 oz)   LMP  (LMP Unknown)   BMI 25.56 kg/m   Estimated body mass index is 25.56 kg/m  as calculated from the following:    Height as of this encounter: 1.613 m (5' 3.5\").    Weight as of this encounter: 66.5 kg (146 lb 9.6 oz).  Medication Reconciliation: complete  Jonathon Calderon CMA    "

## 2019-03-06 NOTE — PATIENT INSTRUCTIONS
[facility] [phone]       Next visit 4 weeks       Please  call [phone] to schedule your ultrasound.

## 2019-03-06 NOTE — PROGRESS NOTES
Doing well.  No concerns today.  Prenatal flowsheet information is reviewed.  Discussed triple/quad screening.  She declines testing at this time.  Reportable signs and symptoms discussed.  US ordered  Labs reviewed  Next visit is 4 weeks.

## 2019-04-03 ENCOUNTER — PRENATAL OFFICE VISIT (OUTPATIENT)
Dept: FAMILY MEDICINE | Facility: CLINIC | Age: 24
End: 2019-04-03
Payer: COMMERCIAL

## 2019-04-03 VITALS
TEMPERATURE: 98.1 F | HEART RATE: 94 BPM | DIASTOLIC BLOOD PRESSURE: 68 MMHG | WEIGHT: 150.4 LBS | BODY MASS INDEX: 26.22 KG/M2 | RESPIRATION RATE: 18 BRPM | SYSTOLIC BLOOD PRESSURE: 124 MMHG

## 2019-04-03 DIAGNOSIS — O34.219 PREVIOUS CESAREAN DELIVERY, ANTEPARTUM CONDITION OR COMPLICATION: ICD-10-CM

## 2019-04-03 DIAGNOSIS — Z34.82 ENCOUNTER FOR SUPERVISION OF OTHER NORMAL PREGNANCY IN SECOND TRIMESTER: Primary | ICD-10-CM

## 2019-04-03 PROBLEM — Z34.81 ENCOUNTER FOR SUPERVISION OF OTHER NORMAL PREGNANCY IN FIRST TRIMESTER: Status: RESOLVED | Noted: 2019-02-06 | Resolved: 2019-04-03

## 2019-04-03 PROCEDURE — 99207 ZZC PRENATAL VISIT: CPT | Performed by: FAMILY MEDICINE

## 2019-04-03 NOTE — NURSING NOTE
"Chief Complaint   Patient presents with     Prenatal Care       Initial /68   Pulse 94   Temp 98.1  F (36.7  C) (Oral)   Resp 18   Wt 68.2 kg (150 lb 6.4 oz)   LMP  (LMP Unknown)   BMI 26.22 kg/m   Estimated body mass index is 26.22 kg/m  as calculated from the following:    Height as of 3/6/19: 1.613 m (5' 3.5\").    Weight as of this encounter: 68.2 kg (150 lb 6.4 oz).  Medication Reconciliation: complete  Jonathon Calderon CMA    "

## 2019-04-03 NOTE — PATIENT INSTRUCTIONS
[facility] [phone]      Next visit 6 weeks, plan to spend 1 hour in clinic for diabetes testing    Schedule visit with OB in 8 weeks.    Please  call [phone] to schedule your ultrasound.

## 2019-04-03 NOTE — PROGRESS NOTES
Doing well.  No concerns today.  Prenatal flowsheet information is reviewed.  Discussed kick counts and fetal movement.  Reportable signs and symptoms discussed.  Schedule US  F/u 6 weeks

## 2019-04-08 ENCOUNTER — ANCILLARY PROCEDURE (OUTPATIENT)
Dept: ULTRASOUND IMAGING | Facility: CLINIC | Age: 24
End: 2019-04-08
Attending: FAMILY MEDICINE
Payer: COMMERCIAL

## 2019-04-08 DIAGNOSIS — Z34.81 ENCOUNTER FOR SUPERVISION OF OTHER NORMAL PREGNANCY IN FIRST TRIMESTER: ICD-10-CM

## 2019-04-08 PROCEDURE — 76805 OB US >/= 14 WKS SNGL FETUS: CPT | Mod: 59

## 2019-05-15 ENCOUNTER — OFFICE VISIT (OUTPATIENT)
Dept: FAMILY MEDICINE | Facility: CLINIC | Age: 24
End: 2019-05-15
Payer: COMMERCIAL

## 2019-05-15 VITALS
SYSTOLIC BLOOD PRESSURE: 117 MMHG | RESPIRATION RATE: 16 BRPM | TEMPERATURE: 97.5 F | HEART RATE: 89 BPM | DIASTOLIC BLOOD PRESSURE: 71 MMHG | BODY MASS INDEX: 28.07 KG/M2 | WEIGHT: 161 LBS

## 2019-05-15 DIAGNOSIS — Z34.83 ENCOUNTER FOR SUPERVISION OF OTHER NORMAL PREGNANCY IN THIRD TRIMESTER: Primary | ICD-10-CM

## 2019-05-15 DIAGNOSIS — O34.219 PREVIOUS CESAREAN DELIVERY, ANTEPARTUM CONDITION OR COMPLICATION: ICD-10-CM

## 2019-05-15 LAB
GLUCOSE 1H P 50 G GLC PO SERPL-MCNC: 121 MG/DL (ref 60–129)
HGB BLD-MCNC: 11.5 G/DL (ref 11.7–15.7)

## 2019-05-15 PROCEDURE — 82950 GLUCOSE TEST: CPT | Performed by: FAMILY MEDICINE

## 2019-05-15 PROCEDURE — 90471 IMMUNIZATION ADMIN: CPT | Performed by: FAMILY MEDICINE

## 2019-05-15 PROCEDURE — 36415 COLL VENOUS BLD VENIPUNCTURE: CPT | Performed by: FAMILY MEDICINE

## 2019-05-15 PROCEDURE — 00000218 ZZHCL STATISTIC OBHBG - HEMOGLOBIN: Performed by: FAMILY MEDICINE

## 2019-05-15 PROCEDURE — 90715 TDAP VACCINE 7 YRS/> IM: CPT | Performed by: FAMILY MEDICINE

## 2019-05-15 PROCEDURE — 99207 ZZC PRENATAL VISIT: CPT | Performed by: FAMILY MEDICINE

## 2019-05-15 NOTE — NURSING NOTE
Prior to injection verified patient identity using patient's name and date of birth.    Patient instructed to wait in clinic for 20 minutes following injection and to notify staff of any adverse reactions immediately.     Screening Questionnaire for Adult Immunization     Are you sick today?   No   Do you have allergies to medications, food or any vaccine?   No   Have you ever had a serious reaction after receiving a vaccination?   No   Do you have a long-term health problem with heart disease, lung disease,  asthma, kidney disease, diabetes, anemia, metabolic or blood disease?   No   Do you have cancer, leukemia, AIDS, or any immune system problem?   No   Do you take cortisone, prednisone, other steroids, or anticancer drugs, or  have you had any x-ray (radiation) treatments?   No   Have you had a seizure, brain, or other nervous system problem?   No   During the past year, have you received a transfusion of blood or blood       products, or been given a medicine called immune (gamma) globulin?   No   For women: Are you pregnant or is there a chance you could become         pregnant during the next month?   Yes   Have you received any vaccinations in the past 4 weeks?   No    Immunization questionnaire was positive for at least one answer.  Notified Dr. Ward.      MNV doesn't apply on this patient  Jonathon Calderon, [facility]

## 2019-05-15 NOTE — NURSING NOTE
"Chief Complaint   Patient presents with     Well Child     1 hr gct       Initial /71   Pulse 89   Temp 97.5  F (36.4  C) (Oral)   Resp 16   Wt 73 kg (161 lb)   LMP  (LMP Unknown)   BMI 28.07 kg/m   Estimated body mass index is 28.07 kg/m  as calculated from the following:    Height as of 3/6/19: 1.613 m (5' 3.5\").    Weight as of this encounter: 73 kg (161 lb).  Medication Reconciliation: complete  Jonathon Calderon, KRISS    "

## 2019-06-03 ENCOUNTER — PRENATAL OFFICE VISIT (OUTPATIENT)
Dept: OBGYN | Facility: CLINIC | Age: 24
End: 2019-06-03
Payer: COMMERCIAL

## 2019-06-03 ENCOUNTER — TELEPHONE (OUTPATIENT)
Dept: OBGYN | Facility: CLINIC | Age: 24
End: 2019-06-03

## 2019-06-03 VITALS
WEIGHT: 162 LBS | DIASTOLIC BLOOD PRESSURE: 73 MMHG | BODY MASS INDEX: 28.25 KG/M2 | TEMPERATURE: 98.6 F | SYSTOLIC BLOOD PRESSURE: 117 MMHG | HEART RATE: 94 BPM

## 2019-06-03 DIAGNOSIS — O34.219 PREVIOUS CESAREAN DELIVERY, ANTEPARTUM CONDITION OR COMPLICATION: Primary | ICD-10-CM

## 2019-06-03 DIAGNOSIS — Z34.83 ENCOUNTER FOR SUPERVISION OF OTHER NORMAL PREGNANCY IN THIRD TRIMESTER: ICD-10-CM

## 2019-06-03 PROCEDURE — 99214 OFFICE O/P EST MOD 30 MIN: CPT | Mod: 24 | Performed by: OBSTETRICS & GYNECOLOGY

## 2019-06-03 NOTE — TELEPHONE ENCOUNTER
Surgery Scheduled.    Date of Surgery 19 Time of Surgery 7:30 am  Procedure:  Delivery Bilateral Tubal Ligation  Hospital/Surgical Facility: [facility]  Surgeon: Dr. kern  Type of Anesthesia Anticipated: Spinal  Pre-op: 19 with [facility]eria Ward at [facility]  2 week post op: 19 with Dr. Kern at [facility]  Pre-certification 6/3/19  Consent signed: yes 6/3/19  Hospital Stay yes     Surgery Pre-Certification    Medical Record Number: [id number]  Lu Syed  YOB: 1995   Phone: [phone] (home)   Primary Provider: Valeria Ward    Reason for Admit:  Previous  delivery, Desires Sterilization    Surgeon: Dr. Kern   Surgical Procedure:   ICD-9 Coded: O34.219  Date of Surgery: 19  Consent signed? Yes    Date signed: 6/3/19  Hospital: [facility]  Inpatient- Length of stay:  3 days.    Requestor:  Azalea Augustin     Location:  [facility]        mailed surgery packet mailed to patient's home address.  Patient instructed NPO 12 hours prior to surgery, arrive 1 3/4 hours prior to surgery, must not have a .  Patient understood and agrees to the plan.     Azalea Augustin  Women's Health

## 2019-06-03 NOTE — PROGRESS NOTES
I was asked to see Lu Syed by Dr. Ward.  She is currently 29 weeks pregnant with a history of a previous  section.     I discussed with her the risks/benefits of a repeat  section, including the risks of bleeding, infection and injury to bowel/bladder and other structures.       Discussed the concerns with > 4  sections.  Reviewed the option of Tubal ligation versus Salpingectomy.  Discussed the reasoning for removal of the tube, in that it may decrease her risk of cancer in the future.  Reviewed the risks,benefits and alternatives of Tubal Ligation/Salpingectomy including but not limited to bleeding, infection, injury to bowel,bladder and other organs, failure rate of 3-1000 for tubal ligation, increased risk of ectopic or tubal pregnancy.  Reviewed permanence of procedure and the fact that there is no reversal option for salpingectomy.  Reviewed pre and post op course.  All questions answered.  Pt appears to understand and desires to proceed.       She is given the opportunity to ask questions and have them answered.  At this time she plans a  and BILATERAL TUBAL LIGATION      20-30 minutes was spent face to face with the patient today discussing her history, diagnosis, and follow-up plan as noted above.  Over 50% of the visit was spent in counseling and coordination of care.     Total Visit Time: 30 minutes.  Wilson Salamanca MD FACOG

## 2019-06-03 NOTE — TELEPHONE ENCOUNTER
Order Providers     Authorizing Provider Encounter Provider   Wilson Salamanca MD Raines, Jeffrey L, MD   Associated Diagnoses     Previous  delivery, antepartum condition or complication  - Primary       Comments     Please schedule this surgery on or after 2019  Wilson Salamanca MD FACOG          Order Questions     Question Answer Comment   Procedure name(s) - multi select  Delivery Bilateral Tubal Ligation    Reason for procedure Previous  delivery, Desires Sterilization    Is this a multi surgeon case? No    Laterality N/A    Request for additional equipment Other (see comments) None   Anesthesia Spinal    Positioning (if different from preference card): Supine    Initiate Pre-op orders for above procedure: Yes, as ordered in Epic additional orders noted there also   Location of Case: [facility]    Sterilization or Hysterectomy consent signed in advance: Yes (note date signed in comments) 6/3/2019   Operating room  requested: Yes    Urgency of Surgery: Routine    Surgeon Procedure Time (incision to closure) in minutes (per procedure as applicable) 60    Note:  Surgical Case Time Needed (in minutes)   Patient Class (for admit prior to surgery, specify number of days in comments): Surgery admit admit day of surgery   Length of Stay: 3 days    H&P To Be Completed By: PCP    Post-Op Appointment 2 weeks    Vendor Needed? No

## 2019-06-03 NOTE — PATIENT INSTRUCTIONS
If you have any questions regarding your visit, Please contact your care team.    [employer] [facility]: [phone]      Women s Health CLINIC HOURS TELEPHONE NUMBER   MD Anette Guillaume CMA Lisa -    RAMIREZ Krueger       Monday:       7:30-4:30 [place]  Wednesday:       7:30-4:30 [place]  Thursday:       7:30-1:30 [place]  Friday:       7:30-11:30 [facility]  [address] [place]  [address]  [phone] ask for [facility]  [address] [place] [address]  [phone] ask for [facility]    Imaging Scheduling for [place]:  [phone]    Imaging Scheduling for [place]: [phone]       Urgent Care locations:    [facility] Saturday and Sunday   9 am - 5 pm    Monday-Friday   12 pm - 8 pm  Saturday and Sunday   9 am - 5 pm   [phone]     [facility] Labor and Delivery:  [phone]    If you need a medication refill, please contact your pharmacy. Please allow 3 business days for your refill to be completed.  As always, Thank you for trusting us with your healthcare needs!

## 2019-06-05 ENCOUNTER — MYC MEDICAL ADVICE (OUTPATIENT)
Dept: FAMILY MEDICINE | Facility: CLINIC | Age: 24
End: 2019-06-05

## 2019-06-05 DIAGNOSIS — Z34.83 ENCOUNTER FOR SUPERVISION OF OTHER NORMAL PREGNANCY IN THIRD TRIMESTER: Primary | ICD-10-CM

## 2019-06-10 NOTE — TELEPHONE ENCOUNTER
Spoke with Vimal JOLLEY from . Checked CPT code 82487, 61490 for  Delivery Bilateral Tubal Ligation.     No PA required. Coverage based on medical necessity.     Ref# [id number]

## 2019-07-02 ENCOUNTER — PRENATAL OFFICE VISIT (OUTPATIENT)
Dept: FAMILY MEDICINE | Facility: CLINIC | Age: 24
End: 2019-07-02
Payer: COMMERCIAL

## 2019-07-02 VITALS
BODY MASS INDEX: 28.7 KG/M2 | WEIGHT: 164.6 LBS | TEMPERATURE: 98.3 F | SYSTOLIC BLOOD PRESSURE: 108 MMHG | DIASTOLIC BLOOD PRESSURE: 74 MMHG | HEART RATE: 86 BPM | RESPIRATION RATE: 16 BRPM

## 2019-07-02 DIAGNOSIS — O34.219 PREVIOUS CESAREAN DELIVERY, ANTEPARTUM CONDITION OR COMPLICATION: Primary | ICD-10-CM

## 2019-07-02 DIAGNOSIS — Z34.83 ENCOUNTER FOR SUPERVISION OF OTHER NORMAL PREGNANCY IN THIRD TRIMESTER: ICD-10-CM

## 2019-07-02 PROCEDURE — 99207 ZZC PRENATAL VISIT: CPT | Performed by: FAMILY MEDICINE

## 2019-07-02 NOTE — NURSING NOTE
"Chief Complaint   Patient presents with     Prenatal Care       Initial /74   Pulse 86   Temp 98.3  F (36.8  C) (Oral)   Resp 16   Wt 74.7 kg (164 lb 9.6 oz)   LMP  (LMP Unknown)   BMI 28.70 kg/m   Estimated body mass index is 28.7 kg/m  as calculated from the following:    Height as of 3/6/19: 1.613 m (5' 3.5\").    Weight as of this encounter: 74.7 kg (164 lb 9.6 oz).  Medication Reconciliation: complete  Jonathon Calderon CMA    "

## 2019-07-19 ENCOUNTER — PRENATAL OFFICE VISIT (OUTPATIENT)
Dept: FAMILY MEDICINE | Facility: CLINIC | Age: 24
End: 2019-07-19
Payer: COMMERCIAL

## 2019-07-19 VITALS
BODY MASS INDEX: 30.06 KG/M2 | DIASTOLIC BLOOD PRESSURE: 70 MMHG | TEMPERATURE: 98.7 F | SYSTOLIC BLOOD PRESSURE: 111 MMHG | HEART RATE: 98 BPM | WEIGHT: 172.4 LBS | RESPIRATION RATE: 16 BRPM

## 2019-07-19 DIAGNOSIS — O34.219 PREVIOUS CESAREAN DELIVERY, ANTEPARTUM CONDITION OR COMPLICATION: Primary | ICD-10-CM

## 2019-07-19 DIAGNOSIS — Z34.83 ENCOUNTER FOR SUPERVISION OF OTHER NORMAL PREGNANCY IN THIRD TRIMESTER: ICD-10-CM

## 2019-07-19 PROCEDURE — 99207 ZZC PRENATAL VISIT: CPT | Performed by: FAMILY MEDICINE

## 2019-07-19 PROCEDURE — 87653 STREP B DNA AMP PROBE: CPT | Performed by: FAMILY MEDICINE

## 2019-07-19 NOTE — NURSING NOTE
"Chief Complaint   Patient presents with     Prenatal Care     gbs       Initial /70   Pulse 98   Temp 98.7  F (37.1  C) (Oral)   Resp 16   Wt 78.2 kg (172 lb 6.4 oz)   LMP  (LMP Unknown)   BMI 30.06 kg/m   Estimated body mass index is 30.06 kg/m  as calculated from the following:    Height as of 3/6/19: 1.613 m (5' 3.5\").    Weight as of this encounter: 78.2 kg (172 lb 6.4 oz).  Medication Reconciliation: complete  Jonathon Calderon CMA    "

## 2019-07-19 NOTE — PROGRESS NOTES
Doing well.  No concerns today.  Cervix is posterior, long, closed and firm.  Cephalic position confirmed by Leopold maneuvers and cervical exam.  GBS done today.  Prenatal flowsheet information is reviewed.  Discussed signs of labor and when to call or come in.  Discussed kick counts and fetal movement.  Reportable signs and symptoms discussed.  Weekly visits

## 2019-07-20 LAB
GP B STREP DNA SPEC QL NAA+PROBE: NEGATIVE
SPECIMEN SOURCE: NORMAL

## 2019-07-29 NOTE — PROGRESS NOTES
[facility]  [address] [facility] [address]  [phone]  Dept: [phone]    PRE-OP EVALUATION:  Today's date: 2019    Lu Syed (: 1995) presents for pre-operative evaluation assessment as requested by Dr. Kern .  She requires evaluation and anesthesia risk assessment prior to undergoing surgery/procedure for treatment of  delivery .    Fax number for surgical facility:   Primary Physician: Valeria Ward  Type of Anesthesia Anticipated: to be determined    Patient has a Health Care Directive or Living Will:  NO    Preop Questions 2019   Who is doing your surgery? dr kern   What are you having done?  section   Date of Surgery/Procedure: 2019   Facility or Hospital where procedure/surgery will be performed: [facility]   1.  Do you have a history of Heart attack, stroke, stent, coronary bypass surgery, or other heart surgery? No   2.  Do you ever have any pain or discomfort in your chest? No   3.  Do you have a history of  Heart Failure? No   4.   Are you troubled by shortness of breath when:  walking on a level surface, or up a slight hill, or at night? No   5.  Do you currently have a cold, bronchitis or other respiratory infection? No   6.  Do you have a cough, shortness of breath, or wheezing? No   7.  Do you sometimes get pains in the calves of your legs when you walk? No   8. Do you or anyone in your family have previous history of blood clots? No   9.  Do you or does anyone in your family have a serious bleeding problem such as prolonged bleeding following surgeries or cuts? No   10. Have you ever had problems with anemia or been told to take iron pills? No   11. Have you had any abnormal blood loss such as black, tarry or bloody stools, or abnormal vaginal bleeding? No   12. Have you ever had a blood transfusion? No   13. Have you or any of your relatives ever had problems with anesthesia? No   14. Do you  have sleep apnea, excessive snoring or daytime drowsiness? No   15. Do you have any prosthetic heart valves? No   16. Do you have prosthetic joints? No   17. Is there any chance that you may be pregnant? YES -  clearance         HPI:     HPI related to upcoming procedure: Pt to have repeat , 4th .      See problem list for active medical problems.  Problems all longstanding and stable, except as noted/documented.  See ROS for pertinent symptoms related to these conditions.      MEDICAL HISTORY:     Patient Active Problem List    Diagnosis Date Noted     Encounter for supervision of other normal pregnancy in third trimester 2019     Priority: Medium     Previous  delivery, antepartum condition or complication 2015     Priority: Medium      Past Medical History:   Diagnosis Date     Encounter for supervision of other normal pregnancy in first trimester 2019     Past Surgical History:   Procedure Laterality Date      SECTION  , 2015    x 2     Current Outpatient Medications   Medication Sig Dispense Refill     Prenatal Vit-Fe Fumarate-FA (PRENATAL PO)        order for DME Equipment being ordered: Dual electric breast pump. Mother of term infant breastfeeding for up to one year, returning to work. 1 Units 0     OTC products: None, except as noted above    No Known Allergies   Latex Allergy: NO    Social History     Tobacco Use     Smoking status: Never Smoker     Smokeless tobacco: Never Used   Substance Use Topics     Alcohol use: No     History   Drug Use No       REVIEW OF SYSTEMS:   CONSTITUTIONAL: NEGATIVE for fever, chills, change in weight  ENT/MOUTH: NEGATIVE for ear, mouth and throat problems  RESP: NEGATIVE for significant cough or SOB  CV: NEGATIVE for chest pain, palpitations or peripheral edema    EXAM:   /77   Pulse 80   Temp 98  F (36.7  C) (Oral)   Resp 16   Wt 79.1 kg (174 lb 6.4 oz)   LMP  (LMP Unknown)   BMI 30.41 kg/m     GENERAL APPEARANCE: healthy, alert and no distress  HENT: ear canals and TM's normal and nose and mouth without ulcers or lesions  RESP: lungs clear to auscultation - no rales, rhonchi or wheezes  CV: regular rate and rhythm, normal S1 S2, no S3 or S4 and no murmur, click or rub   ABDOMEN: soft, nontender, no HSM or masses and bowel sounds normal  NEURO: Normal strength and tone, sensory exam grossly normal, mentation intact and speech normal    DIAGNOSTICS:   No labs or EKG required for low risk surgery (cataract, skin procedure, breast biopsy, etc)    Recent Labs   Lab Test 05/15/19  0924 19  1458  16  1044   HGB 11.5* 11.9   < > 12.1   PLT  --  355  --  356    < > = values in this interval not displayed.        IMPRESSION:   Reason for surgery/procedure: 4th , also having BTL.    The proposed surgical procedure is considered LOW risk.    REVISED CARDIAC RISK INDEX  The patient has the following serious cardiovascular risks for perioperative complications such as (MI, PE, VFib and 3  AV Block):  No serious cardiac risks  INTERPRETATION: 0 risks: Class I (very low risk - 0.4% complication rate)    The patient has the following additional risks for perioperative complications:  No identified additional risks      ICD-10-CM    1. Preop general physical exam Z01.818    2. Encounter for supervision of other normal pregnancy in third trimester Z34.83        RECOMMENDATIONS:     --Patient is on no chronic medications    APPROVAL GIVEN to proceed with proposed procedure, without further diagnostic evaluation       Signed Electronically by: Valeria Ward MD    Copy of this evaluation report is provided to requesting physician.    [facility] Preop Guidelines    Revised Cardiac Risk Index

## 2019-08-05 ENCOUNTER — OFFICE VISIT (OUTPATIENT)
Dept: FAMILY MEDICINE | Facility: CLINIC | Age: 24
End: 2019-08-05
Payer: COMMERCIAL

## 2019-08-05 VITALS
WEIGHT: 174.4 LBS | HEART RATE: 80 BPM | BODY MASS INDEX: 30.41 KG/M2 | RESPIRATION RATE: 16 BRPM | TEMPERATURE: 98 F | DIASTOLIC BLOOD PRESSURE: 77 MMHG | SYSTOLIC BLOOD PRESSURE: 112 MMHG

## 2019-08-05 DIAGNOSIS — Z34.83 ENCOUNTER FOR SUPERVISION OF OTHER NORMAL PREGNANCY IN THIRD TRIMESTER: ICD-10-CM

## 2019-08-05 DIAGNOSIS — Z01.818 PREOP GENERAL PHYSICAL EXAM: Primary | ICD-10-CM

## 2019-08-05 PROCEDURE — 99214 OFFICE O/P EST MOD 30 MIN: CPT | Mod: 25 | Performed by: FAMILY MEDICINE

## 2019-08-05 NOTE — NURSING NOTE
"Chief Complaint   Patient presents with     Pre-Op Exam     Prenatal Care       Initial /77   Pulse 80   Temp 98  F (36.7  C) (Oral)   Resp 16   Wt 79.1 kg (174 lb 6.4 oz)   LMP  (LMP Unknown)   BMI 30.41 kg/m   Estimated body mass index is 30.41 kg/m  as calculated from the following:    Height as of 3/6/19: 1.613 m (5' 3.5\").    Weight as of this encounter: 79.1 kg (174 lb 6.4 oz).  Medication Reconciliation: complete  Jonathon Calderon CMA    "

## 2019-08-10 ENCOUNTER — TRANSFERRED RECORDS (OUTPATIENT)
Dept: HEALTH INFORMATION MANAGEMENT | Facility: CLINIC | Age: 24
End: 2019-08-10

## 2019-08-13 ENCOUNTER — NURSE TRIAGE (OUTPATIENT)
Dept: NURSING | Facility: CLINIC | Age: 24
End: 2019-08-13

## 2019-08-14 ENCOUNTER — TELEPHONE (OUTPATIENT)
Dept: OBGYN | Facility: CLINIC | Age: 24
End: 2019-08-14

## 2019-08-14 NOTE — TELEPHONE ENCOUNTER
Patient inquiry/concerns (127): Contractions every 7 min x 1 hr. Had false labor 1 week ago and cervix was 0-1 cm dilated and sent home from Labor and Delivery. Scheduled for 4th  section on . Wonders if she should go in to the hospital but does not want to be sent home. No other significant or specific sx.  History and available/pertinent chart info rev'd.  Discussed condition, E&M considerations, prognosis, appropriate precautions and instructions and follow-up.   Reviewed clinic priority, specialty, Urgent Care, ED and Hospital evaluation indications as appropriate.  Medications and prescriptions given as noted.  I reviewed side effects, risks, benefits and instructions on proper use.  Reviewed supportive care measures.  Plan: Explained that I cannot determine much on her status over the phone and that a Labor and Delivery evaluation is reasonable to assess further, certainly if the contractions continue and increase or if other new developments.   Patient understands and agrees with plan. All questions answered. Patient to call if additional questions, concerns or significant changes in status.    Edwar Kahn MD

## 2019-08-14 NOTE — TELEPHONE ENCOUNTER
"Margarita calls and says that she is 39w1d pregnant, with her 4th baby. Pt. Says that her contractions are 4 minutes apart. Denies any bleeding. Dr. Kahn-[facility] -was then paged, to call Margarita, at: [phone], at: [phone], via smart web.    Reason for Disposition    [1] History of prior delivery (multipara) AND [2] contractions < 10 minutes apart AND [3] present 1 hour    Additional Information    Negative: Passed out (i.e., lost consciousness, collapsed and was not responding)    Negative: Shock suspected (e.g., cold/pale/clammy skin, too weak to stand, low BP, rapid pulse)    Negative: Difficult to awaken or acting confused (e.g., disoriented, slurred speech)    Negative: [1] SEVERE abdominal pain (e.g., excruciating) AND [2] constant AND [3] present > 1 hour    Negative: Severe bleeding (e.g., continuous red blood from vagina, or large blood clots)    Negative: Umbilical cord hanging out of the vagina (shiny, white, curled appearance, \"like telephone cord\")    Negative: Uncontrollable urge to push (i.e., feels like baby is coming out now)    Negative: Can see baby    Negative: Sounds like a life-threatening emergency to the triager    Negative: Pregnant < 37 weeks (i.e., )    Negative: [1] Uncertain delivery date AND [2] possibly pregnant < 37 weeks (i.e., )    Negative: [1] First baby (primipara) AND [2] contractions < 6 minutes apart  AND [3] present 2 hours    Protocols used: PREGNANCY - LABOR-A-AH      "

## 2019-08-15 ENCOUNTER — TELEPHONE (OUTPATIENT)
Dept: OBGYN | Facility: CLINIC | Age: 24
End: 2019-08-15

## 2019-08-15 NOTE — TELEPHONE ENCOUNTER
[place]  Paperwork to be filled out by provider-Patient was delivered by Dr Salamanca . PCP unable to fill out form due to does not know patients limitations. Forms placed in OB/GYN box at [facility]. Paperwork is from patient's mother's employer.

## 2019-08-16 ENCOUNTER — TRANSFERRED RECORDS (OUTPATIENT)
Dept: HEALTH INFORMATION MANAGEMENT | Facility: CLINIC | Age: 24
End: 2019-08-16

## 2019-08-19 NOTE — TELEPHONE ENCOUNTER
Patient needs to sign ANGY in order to release medical information to mothers employer. Forms faxed to  for TC.    Anette Sánchez CMA  August 19, 2019  2:23 PM

## 2019-08-20 NOTE — TELEPHONE ENCOUNTER
Form filled out and placed in providers box to review and sign.    Azalea Augustin  Women's Health

## 2019-08-21 ENCOUNTER — MYC MEDICAL ADVICE (OUTPATIENT)
Dept: FAMILY MEDICINE | Facility: CLINIC | Age: 24
End: 2019-08-21

## 2019-08-21 NOTE — TELEPHONE ENCOUNTER
These forms were forwarded to Dr Salamanca Care Team. They look like they are just awaiting Dr Salamanca to review and sign them.  BELÉN Guajardo

## 2019-08-21 NOTE — TELEPHONE ENCOUNTER
Doc sent to patient to clarify what forms they are looking for.   Jenny MELISSA - Can you please see if there are forms that came over for this patient.  I did send a message to the patient asking what forms.  Thank you. Jenny Mcneill R.N.

## 2019-08-22 NOTE — TELEPHONE ENCOUNTER
Form has been signed by provider and faxed back to # on form. Placed copy to go to scanning    Azalea Augustin  Women's Health

## 2019-12-16 ENCOUNTER — MEDICAL CORRESPONDENCE (OUTPATIENT)
Dept: HEALTH INFORMATION MANAGEMENT | Facility: CLINIC | Age: 24
End: 2019-12-16

## 2020-01-17 ENCOUNTER — E-VISIT (OUTPATIENT)
Dept: FAMILY MEDICINE | Facility: CLINIC | Age: 25
End: 2020-01-17
Payer: COMMERCIAL

## 2020-01-17 DIAGNOSIS — R30.0 DYSURIA: Primary | ICD-10-CM

## 2020-01-17 PROCEDURE — 99421 OL DIG E/M SVC 5-10 MIN: CPT | Performed by: FAMILY MEDICINE

## 2020-03-10 ENCOUNTER — HEALTH MAINTENANCE LETTER (OUTPATIENT)
Age: 25
End: 2020-03-10

## 2020-05-15 ENCOUNTER — E-VISIT (OUTPATIENT)
Dept: FAMILY MEDICINE | Facility: CLINIC | Age: 25
End: 2020-05-15
Payer: MEDICAID

## 2020-05-15 DIAGNOSIS — J02.0 STREPTOCOCCAL PHARYNGITIS: Primary | ICD-10-CM

## 2020-05-15 PROCEDURE — 99422 OL DIG E/M SVC 11-20 MIN: CPT | Performed by: FAMILY MEDICINE

## 2020-05-15 RX ORDER — AMOXICILLIN 500 MG/1
500 CAPSULE ORAL 2 TIMES DAILY
Qty: 20 CAPSULE | Refills: 0 | Status: SHIPPED | OUTPATIENT
Start: 2020-05-15 | End: 2020-05-25

## 2020-07-15 ENCOUNTER — E-VISIT (OUTPATIENT)
Dept: FAMILY MEDICINE | Facility: CLINIC | Age: 25
End: 2020-07-15
Payer: COMMERCIAL

## 2020-07-15 DIAGNOSIS — N93.9 ABNORMAL VAGINAL BLEEDING: Primary | ICD-10-CM

## 2020-07-15 PROCEDURE — 99422 OL DIG E/M SVC 11-20 MIN: CPT | Performed by: FAMILY MEDICINE

## 2020-07-16 DIAGNOSIS — N93.9 ABNORMAL VAGINAL BLEEDING: ICD-10-CM

## 2020-07-16 LAB — HGB BLD-MCNC: 12.3 G/DL (ref 11.7–15.7)

## 2020-07-16 PROCEDURE — 36415 COLL VENOUS BLD VENIPUNCTURE: CPT | Performed by: FAMILY MEDICINE

## 2020-07-16 PROCEDURE — 84702 CHORIONIC GONADOTROPIN TEST: CPT | Performed by: FAMILY MEDICINE

## 2020-07-16 PROCEDURE — 85018 HEMOGLOBIN: CPT | Performed by: FAMILY MEDICINE

## 2020-07-17 LAB — B-HCG SERPL-ACNC: <1 IU/L (ref 0–5)

## 2020-12-09 ENCOUNTER — TELEPHONE (OUTPATIENT)
Dept: FAMILY MEDICINE | Facility: CLINIC | Age: 25
End: 2020-12-09

## 2020-12-09 ENCOUNTER — E-VISIT (OUTPATIENT)
Dept: FAMILY MEDICINE | Facility: CLINIC | Age: 25
End: 2020-12-09

## 2020-12-09 DIAGNOSIS — R30.0 DYSURIA: ICD-10-CM

## 2020-12-09 DIAGNOSIS — R30.0 DYSURIA: Primary | ICD-10-CM

## 2020-12-09 LAB

## 2020-12-09 PROCEDURE — 87186 SC STD MICRODIL/AGAR DIL: CPT | Performed by: PHYSICIAN ASSISTANT

## 2020-12-09 PROCEDURE — 99422 OL DIG E/M SVC 11-20 MIN: CPT | Performed by: PHYSICIAN ASSISTANT

## 2020-12-09 PROCEDURE — 87086 URINE CULTURE/COLONY COUNT: CPT | Performed by: PHYSICIAN ASSISTANT

## 2020-12-09 PROCEDURE — 87088 URINE BACTERIA CULTURE: CPT | Performed by: PHYSICIAN ASSISTANT

## 2020-12-09 PROCEDURE — 81001 URINALYSIS AUTO W/SCOPE: CPT | Performed by: PHYSICIAN ASSISTANT

## 2020-12-09 RX ORDER — SULFAMETHOXAZOLE/TRIMETHOPRIM 800-160 MG
1 TABLET ORAL 2 TIMES DAILY
Qty: 6 TABLET | Refills: 0 | Status: SHIPPED | OUTPATIENT
Start: 2020-12-09 | End: 2020-12-10

## 2020-12-09 NOTE — TELEPHONE ENCOUNTER
Reason for Call:  Other prescription    Detailed comments: Patient requesting her medication ( Bactrim) be transferred to [facility] [address], [place]    Phone Number Patient can be reached at: Cell number on file:    Telephone Information:   Mobile [phone]       Best Time: Anytime    Can we leave a detailed message on this number? YES    Call taken on 12/9/2020 at 3:01 PM by Valeria Cantu

## 2020-12-10 RX ORDER — SULFAMETHOXAZOLE/TRIMETHOPRIM 800-160 MG
1 TABLET ORAL 2 TIMES DAILY
Qty: 6 TABLET | Refills: 0 | Status: SHIPPED | OUTPATIENT
Start: 2020-12-10 | End: 2023-07-25

## 2020-12-11 LAB
BACTERIA SPEC CULT: ABNORMAL
Lab: ABNORMAL
SPECIMEN SOURCE: ABNORMAL

## 2020-12-13 ENCOUNTER — E-VISIT (OUTPATIENT)
Dept: FAMILY MEDICINE | Facility: CLINIC | Age: 25
End: 2020-12-13
Payer: COMMERCIAL

## 2020-12-13 DIAGNOSIS — R19.7 DIARRHEA, UNSPECIFIED TYPE: Primary | ICD-10-CM

## 2020-12-13 PROCEDURE — 99422 OL DIG E/M SVC 11-20 MIN: CPT | Performed by: FAMILY MEDICINE

## 2020-12-15 ENCOUNTER — MYC MEDICAL ADVICE (OUTPATIENT)
Dept: FAMILY MEDICINE | Facility: CLINIC | Age: 25
End: 2020-12-15

## 2020-12-15 DIAGNOSIS — N39.0 URINARY TRACT INFECTION WITHOUT HEMATURIA, SITE UNSPECIFIED: Primary | ICD-10-CM

## 2020-12-15 NOTE — TELEPHONE ENCOUNTER
"See MyChart messages below. Also see 12/13/20 Evisit.    Pt states, \"It was confirmed that I do have a UTI. and I am still having urgency with urinating and painful sensations.\" Pt states she has had UTI for 3 weeks now.  Pt states she took the following antibiotic for 1.5 days: Bactrim DS 1 tab bid.    RICHARD AvalosN, RN    "

## 2020-12-15 NOTE — TELEPHONE ENCOUNTER
Provider:  Would you like to address this message or should the patient have an evaluation?     Per E-visit note dated 12/13/2020 from Dr. Valeria Ward is as follows:

## 2020-12-16 RX ORDER — CIPROFLOXACIN 500 MG/1
500 TABLET, FILM COATED ORAL 2 TIMES DAILY
Qty: 14 TABLET | Refills: 0 | Status: SHIPPED | OUTPATIENT
Start: 2020-12-16 | End: 2020-12-23

## 2020-12-16 NOTE — TELEPHONE ENCOUNTER
New antibiotic sent take until gone, monitor for side effects   Had bloody diarrhea with bactrim though unclear if caused by abx or separate issue.  Pt aware via [employer] message

## 2020-12-20 ENCOUNTER — HEALTH MAINTENANCE LETTER (OUTPATIENT)
Age: 25
End: 2020-12-20

## 2021-04-24 ENCOUNTER — HEALTH MAINTENANCE LETTER (OUTPATIENT)
Age: 26
End: 2021-04-24

## 2021-06-15 ENCOUNTER — TELEPHONE (OUTPATIENT)
Dept: FAMILY MEDICINE | Facility: CLINIC | Age: 26
End: 2021-06-15

## 2021-06-15 NOTE — TELEPHONE ENCOUNTER
Patient Quality Outreach      Summary:    Patient has the following on her problem list/HM: None    Patient is due/failing the following:   Cervical Cancer Screening - PAP Needed    Type of outreach:    Sent [employer] message.    Questions for provider review:    None                                                                                                                                     Jonathon Calderon CMA       Chart routed to closed.        \

## 2021-10-03 ENCOUNTER — HEALTH MAINTENANCE LETTER (OUTPATIENT)
Age: 26
End: 2021-10-03

## 2021-11-03 ENCOUNTER — E-VISIT (OUTPATIENT)
Dept: FAMILY MEDICINE | Facility: CLINIC | Age: 26
End: 2021-11-03
Payer: COMMERCIAL

## 2021-11-03 DIAGNOSIS — N39.0 ACUTE UTI (URINARY TRACT INFECTION): Primary | ICD-10-CM

## 2021-11-03 PROCEDURE — 99421 OL DIG E/M SVC 5-10 MIN: CPT | Performed by: FAMILY MEDICINE

## 2022-05-15 ENCOUNTER — HEALTH MAINTENANCE LETTER (OUTPATIENT)
Age: 27
End: 2022-05-15

## 2022-09-11 ENCOUNTER — HEALTH MAINTENANCE LETTER (OUTPATIENT)
Age: 27
End: 2022-09-11

## 2023-06-03 ENCOUNTER — HEALTH MAINTENANCE LETTER (OUTPATIENT)
Age: 28
End: 2023-06-03

## 2023-07-25 ENCOUNTER — E-VISIT (OUTPATIENT)
Dept: FAMILY MEDICINE | Facility: CLINIC | Age: 28
End: 2023-07-25
Payer: COMMERCIAL

## 2023-07-25 ENCOUNTER — TRANSFERRED RECORDS (OUTPATIENT)
Dept: HEALTH INFORMATION MANAGEMENT | Facility: CLINIC | Age: 28
End: 2023-07-25

## 2023-07-25 DIAGNOSIS — N94.6 DYSMENORRHEA: Primary | ICD-10-CM

## 2023-07-25 PROCEDURE — 99207 PR NON-BILLABLE SERV PER CHARTING: CPT | Performed by: FAMILY MEDICINE

## 2023-08-17 ENCOUNTER — OFFICE VISIT (OUTPATIENT)
Dept: FAMILY MEDICINE | Facility: CLINIC | Age: 28
End: 2023-08-17
Attending: FAMILY MEDICINE
Payer: COMMERCIAL

## 2023-08-17 VITALS
OXYGEN SATURATION: 99 % | SYSTOLIC BLOOD PRESSURE: 132 MMHG | HEIGHT: 64 IN | BODY MASS INDEX: 22.88 KG/M2 | WEIGHT: 134 LBS | DIASTOLIC BLOOD PRESSURE: 84 MMHG | RESPIRATION RATE: 18 BRPM | HEART RATE: 64 BPM | TEMPERATURE: 97.6 F

## 2023-08-17 DIAGNOSIS — N85.8 UTERINE MASS: ICD-10-CM

## 2023-08-17 DIAGNOSIS — Z00.00 ROUTINE GENERAL MEDICAL EXAMINATION AT A HEALTH CARE FACILITY: Primary | ICD-10-CM

## 2023-08-17 PROBLEM — Z34.83 ENCOUNTER FOR SUPERVISION OF OTHER NORMAL PREGNANCY IN THIRD TRIMESTER: Status: RESOLVED | Noted: 2019-02-06 | Resolved: 2023-08-17

## 2023-08-17 PROCEDURE — G0145 SCR C/V CYTO,THINLAYER,RESCR: HCPCS | Performed by: FAMILY MEDICINE

## 2023-08-17 PROCEDURE — 99385 PREV VISIT NEW AGE 18-39: CPT | Performed by: FAMILY MEDICINE

## 2023-08-17 PROCEDURE — 87624 HPV HI-RISK TYP POOLED RSLT: CPT | Performed by: FAMILY MEDICINE

## 2023-08-17 ASSESSMENT — ENCOUNTER SYMPTOMS
HEMATOCHEZIA: 0
NERVOUS/ANXIOUS: 0
SHORTNESS OF BREATH: 0
WEAKNESS: 0
HEARTBURN: 0
JOINT SWELLING: 0
NAUSEA: 0
SORE THROAT: 0
DIARRHEA: 0
DIZZINESS: 0
CHILLS: 0
FEVER: 0
PALPITATIONS: 0
DYSURIA: 0
CONSTIPATION: 0
FREQUENCY: 0
HEADACHES: 0
BREAST MASS: 0
EYE PAIN: 0
ARTHRALGIAS: 0
HEMATURIA: 0
MYALGIAS: 0
COUGH: 0
ABDOMINAL PAIN: 0
PARESTHESIAS: 0

## 2023-08-17 ASSESSMENT — PAIN SCALES - GENERAL: PAINLEVEL: NO PAIN (0)

## 2023-08-17 NOTE — PATIENT INSTRUCTIONS

## 2023-08-17 NOTE — PROGRESS NOTES
SUBJECTIVE:   CC: Margarita is an 28 year old who presents for preventive health visit.       8/17/2023     6:51 AM   Additional Questions   Roomed by Earle KING LPN   Accompanied by Self         8/17/2023     6:51 AM   Patient Reported Additional Medications   Patient reports taking the following new medications None       Healthy Habits:     Getting at least 3 servings of Calcium per day:  Yes    Bi-annual eye exam:  NO    Dental care twice a year:  NO    Sleep apnea or symptoms of sleep apnea:  None    Diet:  Regular (no restrictions)    Frequency of exercise:  2-3 days/week    Duration of exercise:  30-45 minutes    Taking medications regularly:  Not Applicable    Medication side effects:  None    Additional concerns today:  Yes        Have you ever done Advance Care Planning? (For example, a Health Directive, POLST, or a discussion with a medical provider or your loved ones about your wishes): No, advance care planning information given to patient to review.  Patient plans to discuss their wishes with loved ones or provider.      Social History     Tobacco Use    Smoking status: Never    Smokeless tobacco: Never   Substance Use Topics    Alcohol use: No             8/17/2023     6:51 AM   Alcohol Use   Prescreen: >3 drinks/day or >7 drinks/week? No     Reviewed orders with patient.  Reviewed health maintenance and updated orders accordingly - Yes    G 4 P 4   Patient's last menstrual period was 08/03/2023 (exact date).     Fasting: No  Td:Tdap 5/2019       Flu: declined      Covid: declined      Shingrix: NA      PPV: NA      Last 3 Pap and HPV Results:       4/24/2017     2:08 PM   PAP / HPV   PAP (Historical) NIL                   Cholesterol: No results found for: CHOL  No results found for: HDL  No results found for: LDL  No results found for: TRIG  No results found for: CHOLHDLRATIO      MMG: NA  Dexa:  NA     Flex/colo: NA      Seat Belt: Yes    Sunscreen use: Yes   Calcium Intake: adeq  Health Care  Directive: No  Sexually Active: Yes     Current contraception: tubal ligation, attempting IVF  History of abnormal Pap smear: No  Family history of colon/breast/ovarian cancer: No  Regular self breast exam: No  History of abnormal mammogram: No      Labs reviewed in EPIC  BP Readings from Last 3 Encounters:   23 132/84   19 112/77   19 111/70    Wt Readings from Last 3 Encounters:   23 60.8 kg (134 lb)   19 79.1 kg (174 lb 6.4 oz)   19 78.2 kg (172 lb 6.4 oz)                  Patient Active Problem List   Diagnosis   (none) - all problems resolved or deleted     Past Surgical History:   Procedure Laterality Date     SECTION  , 2015    x 2       Social History     Tobacco Use    Smoking status: Never    Smokeless tobacco: Never   Substance Use Topics    Alcohol use: No     Family History   Problem Relation Age of Onset    Substance Abuse Brother          No current outpatient medications on file.       Breast Cancer Screening:  Any new diagnosis of family breast, ovarian, or bowel cancer? No    FHS-7:        No data to display                Patient under 40 years of age: Routine Mammogram Screening not recommended.   Pertinent mammograms are reviewed under the imaging tab.        Reviewed and updated as needed this visit by clinical staff   Tobacco  Allergies  Meds  Problems  Med Hx  Surg Hx  Fam Hx          Reviewed and updated as needed this visit by Provider   Tobacco  Allergies  Meds  Problems  Med Hx  Surg Hx  Fam Hx             Review of Systems   Constitutional:  Negative for chills and fever.   HENT:  Negative for congestion, ear pain, hearing loss and sore throat.    Eyes:  Negative for pain and visual disturbance.   Respiratory:  Negative for cough and shortness of breath.    Cardiovascular:  Negative for chest pain, palpitations and peripheral edema.   Gastrointestinal:  Negative for abdominal pain, constipation, diarrhea, heartburn,  "hematochezia and nausea.   Breasts:  Negative for tenderness, breast mass and discharge.   Genitourinary:  Negative for dysuria, frequency, genital sores, hematuria, pelvic pain, urgency, vaginal bleeding and vaginal discharge.   Musculoskeletal:  Negative for arthralgias, joint swelling and myalgias.   Skin:  Negative for rash.   Neurological:  Negative for dizziness, weakness, headaches and paresthesias.   Psychiatric/Behavioral:  Negative for mood changes. The patient is not nervous/anxious.           OBJECTIVE:   /84   Pulse 64   Temp 97.6  F (36.4  C) (Tympanic)   Resp 18   Ht 1.613 m (5' 3.5\")   Wt 60.8 kg (134 lb)   LMP 08/03/2023 (Exact Date)   SpO2 99%   Breastfeeding No   BMI 23.36 kg/m    Physical Exam  GENERAL: healthy, alert and no distress  EYES: Eyes grossly normal to inspection, PERRL and conjunctivae and sclerae normal  HENT: ear canals and TM's normal, nose and mouth without ulcers or lesions  NECK: no adenopathy, no asymmetry, masses, or scars and thyroid normal to palpation  RESP: lungs clear to auscultation - no rales, rhonchi or wheezes  BREAST: normal without masses, tenderness or nipple discharge and no palpable axillary masses or adenopathy  CV: regular rate and rhythm, normal S1 S2, no S3 or S4, no murmur, click or rub, no peripheral edema and peripheral pulses strong  ABDOMEN: soft, nontender, no hepatosplenomegaly, no masses and bowel sounds normal   (female): normal female external genitalia, normal urethral meatus, vaginal mucosa pink, moist, well rugated, and normal cervix/adnexa/uterus without masses or discharge  MS: no gross musculoskeletal defects noted, no edema  SKIN: no suspicious lesions or rashes  NEURO: Normal strength and tone, mentation intact and speech normal  PSYCH: mentation appears normal, affect normal/bright    Diagnostic Test Results:  Labs reviewed in Epic    ASSESSMENT/PLAN:   (Z00.00) Routine general medical examination at a health care facility  " (primary encounter diagnosis)  Comment: preventive needs reviewed   Plan: Pap Screen with HPV - recommended age 30 - 65         years        see orders in Epic.     (N85.8) Uterine mass  Comment: noted abnl lesion on workup for IVF (pt had tubal and now with new partner who would like a child)  Plan: pt will get report, likely refer to gyn after review of report.            COUNSELING:  Reviewed preventive health counseling, as reflected in patient instructions  Special attention given to:        Regular exercise       Healthy diet/nutrition        She reports that she has never smoked. She has never used smokeless tobacco.          Valeria Ward MD  [facility]

## 2023-08-21 LAB
BKR LAB AP GYN ADEQUACY: NORMAL
BKR LAB AP GYN INTERPRETATION: NORMAL
BKR LAB AP HPV REFLEX: NORMAL
BKR LAB AP PREVIOUS ABNORMAL: NORMAL
PATH REPORT.COMMENTS IMP SPEC: NORMAL
PATH REPORT.COMMENTS IMP SPEC: NORMAL
PATH REPORT.RELEVANT HX SPEC: NORMAL

## 2023-08-23 LAB
HUMAN PAPILLOMA VIRUS 16 DNA: NEGATIVE
HUMAN PAPILLOMA VIRUS 18 DNA: NEGATIVE
HUMAN PAPILLOMA VIRUS FINAL DIAGNOSIS: NORMAL
HUMAN PAPILLOMA VIRUS OTHER HR: NEGATIVE

## 2023-08-24 ENCOUNTER — TELEPHONE (OUTPATIENT)
Dept: FAMILY MEDICINE | Facility: CLINIC | Age: 28
End: 2023-08-24
Payer: COMMERCIAL

## 2023-08-24 DIAGNOSIS — R93.89 ABNORMAL PELVIC ULTRASOUND: Primary | ICD-10-CM

## 2023-08-24 NOTE — TELEPHONE ENCOUNTER
Patient calling to see if her HSG x-ray and ultrasound reports have been received by our clinic yet.   The place she was having them sent from said they have sent them twice and it shows they were received by us both times. Dates of 8/9 and 8/24/23 (today).  This RN does not see a report in patient's chart but informed patient that they may have been received by our radiology dept and are not yet in chart.     Routing to [facility] radiology dept to check on the above results to see if they have been received.    Ny Blake BSN, RN

## 2023-08-28 NOTE — TELEPHONE ENCOUNTER
Spoke with patient about requesting CD for HSG, x-ray, and ultrasound.  Also gave patient recommendation to schedule visit with ob/gyn right away, per Dr. Ward.  Blank STEVENS    [facility]

## 2023-08-28 NOTE — TELEPHONE ENCOUNTER
Please stat scan reports.  Placed in TC basket.    Please notify pt to call and request images be put on CD and mailed to her so we can transfer them to our system.    She does not need to wait for that to consult with Gyn, she should make that appt.

## 2023-09-08 ENCOUNTER — TELEPHONE (OUTPATIENT)
Dept: OBGYN | Facility: CLINIC | Age: 28
End: 2023-09-08
Payer: COMMERCIAL

## 2023-09-08 NOTE — TELEPHONE ENCOUNTER
Patient called back re: any other info available.    Pt states only info she has, has been scanned into chart:   [facility] center in [place], [place] dated 7/25/2023 scanned into chart under imaging    Advised patient this info would be routed to Lesley to advise if pt may need to reschedule with physician.   On 8/24/23 Dr. Ward told patient she wanted her schedule with ob/gyn right away.

## 2023-09-08 NOTE — TELEPHONE ENCOUNTER
Report reviewed-looks like possible endometrial polyp. Should see physician. Thank you. Lesley BRYAN CNP

## 2023-09-08 NOTE — TELEPHONE ENCOUNTER
Unable to reach patient via phone. RN left a message and instructed patient to call the clinic at [phone].     Re: Lesley's staff message copy/pasted below:    Patient scheduled to see me next week-from what I can tell, had imaging (HSG and ultrasound) done as part of infertility work up and uterine mass was noted. Images in chart, but no report. Can we see if patient has more information on this? She may need to be seeing physician. Thank you. Lesley BRYAN CNP     History:     8/24 telephone encounter :   Spoke with patient about requesting CD for HSG, x-ray, and ultrasound. Also gave patient recommendation to schedule visit with ob/gyn right away, per Dr. Ward.    per Dr. Ward OV 8/17/2023 :  Uterine mass  Comment: noted abnl lesion on workup for IVF (pt had tubal and now with new partner who would like a child)  Plan: pt will get report, likely refer to gyn after review of report.

## 2023-09-25 ENCOUNTER — E-VISIT (OUTPATIENT)
Dept: FAMILY MEDICINE | Facility: CLINIC | Age: 28
End: 2023-09-25
Payer: COMMERCIAL

## 2023-09-25 DIAGNOSIS — R30.0 DYSURIA: Primary | ICD-10-CM

## 2023-09-25 PROCEDURE — 99423 OL DIG E/M SVC 21+ MIN: CPT | Performed by: FAMILY MEDICINE

## 2023-09-26 ENCOUNTER — LAB (OUTPATIENT)
Dept: LAB | Facility: CLINIC | Age: 28
End: 2023-09-26
Payer: COMMERCIAL

## 2023-09-26 DIAGNOSIS — R30.0 DYSURIA: ICD-10-CM

## 2023-09-26 LAB
ALBUMIN UR-MCNC: NEGATIVE MG/DL
APPEARANCE UR: CLEAR
BILIRUB UR QL STRIP: NEGATIVE
COLOR UR AUTO: YELLOW
GLUCOSE UR STRIP-MCNC: NEGATIVE MG/DL
HGB UR QL STRIP: NEGATIVE
KETONES UR STRIP-MCNC: NEGATIVE MG/DL
LEUKOCYTE ESTERASE UR QL STRIP: NEGATIVE
NITRATE UR QL: NEGATIVE
PH UR STRIP: 6 [PH] (ref 5–7)
SP GR UR STRIP: <=1.005 (ref 1–1.03)
UROBILINOGEN UR STRIP-ACNC: 0.2 E.U./DL

## 2023-09-26 PROCEDURE — 81003 URINALYSIS AUTO W/O SCOPE: CPT

## 2023-09-26 PROCEDURE — 87086 URINE CULTURE/COLONY COUNT: CPT

## 2023-09-27 LAB — BACTERIA UR CULT: NO GROWTH

## 2023-09-28 NOTE — PATIENT INSTRUCTIONS
If you have any questions regarding your visit, Please contact your care team.     [facility]: [phone]    To Schedule an Appointment 24/7  Call: [phone]     Wilson Salamanca MD  Medical Director    Susan Krueger-RAMIREZ Thomas-Surgery Scheduler  Maria M-Surgery Scheduler               Tuesday-[place]  7:30 a.m-4:30 p.m    Thursday-[place]  7:30 a.m-4:30 p.m    Typical Surgery Days: Tuesday or Friday [facility] [place]  [address] [place] [address]  PH: [phone]     Imaging Scheduling all locations  PH: [phone]     [facility]  [address] [place] [address] [place] [address]  PH: [phone]    [facility]  [address] [place] [address]  PH: [phone] Fax      **Surgeries** Our Surgery Schedulers will contact you to schedule. If you do not receive a call within 3 business days, please call [phone].    Urgent Care locations:  [facility] Monday-Friday  10 am - 8 pm  Saturday and Sunday   9 am - 5 pm  Monday-Friday   10 am - 8 pm  Saturday and Sunday   9 am - 5 pm   [phone]   If you need a medication refill, please contact your pharmacy. Please allow 3 business days for your refill to be completed.  As always, Thank you for trusting us with your healthcare needs!    see additional instructions from your care team below

## 2023-10-02 ENCOUNTER — OFFICE VISIT (OUTPATIENT)
Dept: OBGYN | Facility: CLINIC | Age: 28
End: 2023-10-02
Payer: COMMERCIAL

## 2023-10-02 VITALS
SYSTOLIC BLOOD PRESSURE: 120 MMHG | BODY MASS INDEX: 23.64 KG/M2 | DIASTOLIC BLOOD PRESSURE: 81 MMHG | WEIGHT: 135.6 LBS | HEART RATE: 71 BPM | OXYGEN SATURATION: 98 %

## 2023-10-02 DIAGNOSIS — N84.0 ENDOMETRIAL POLYP: Primary | ICD-10-CM

## 2023-10-02 PROCEDURE — 99203 OFFICE O/P NEW LOW 30 MIN: CPT | Performed by: OBSTETRICS & GYNECOLOGY

## 2023-10-02 NOTE — PROGRESS NOTES
uL is a 28 year old   is here today to consult about surgery.  She is working with infertility with plans for IVF.  They found an endometrial polyp on sonohysterogram.       ROS: Pertinent ROS as above.    Gyn Hx:      Past Medical History:   Diagnosis Date    Encounter for supervision of other normal pregnancy in first trimester 2019     Past Surgical History:   Procedure Laterality Date     SECTION  2013, 2015    x 2         ALL/Meds: Her medication and allergy histories were reviewed and are documented in their appropriate chart areas.    SH: Reviewed and documented in the appropriate area of the chart.  FH:  Her family history is reviewed and updated in the chart, today.  PMH: Her past medical, surgical, and obstetric histories were reviewed and updated today in the appropriate chart areas.    PE: /81 (BP Location: Left arm, Patient Position: Chair, Cuff Size: Adult Regular)   Pulse 71   Wt 61.5 kg (135 lb 9.6 oz)   LMP 2023 (Exact Date)   SpO2 98%   BMI 23.64 kg/m    Body mass index is 23.64 kg/m .    Pertinent Physical exam findings:    General Appearance:  healthy, alert, active, no distress  Reviewed our understanding of endometrial polyps.  Reviewed  risks, benefits, and alternatives of Hysteroscopy including but not limited to bleeding, infection, uterine perforation with damage to other organs, and possible need to for Laparoscopy or Laparotomy.  Reviewed pre and post operative course. Patient to see her primary care provider for pre-op evaluation.  All questions answered.  She appears to understand and does agree to proceed.           A/P:(N84.0) Endometrial polyp  (primary encounter diagnosis)  Comment: 30 minutes spent on the date of the encounter doing chart review, review of test results, patient visit, and documentation    Plan: Case Request: Hysteroscopic Polypectomy             - No orders of the defined types were placed in this encounter.

## 2023-10-03 ENCOUNTER — TELEPHONE (OUTPATIENT)
Dept: OBGYN | Facility: CLINIC | Age: 28
End: 2023-10-03
Payer: COMMERCIAL

## 2023-10-03 PROBLEM — N84.0 ENDOMETRIAL POLYP: Status: ACTIVE | Noted: 2023-10-02

## 2023-10-03 NOTE — TELEPHONE ENCOUNTER
Associated Diagnoses    Endometrial polyp [N84.0]  - Primary        Source Order Set    Order Set Name Order ID    [id number]     Case Request: Case Info    Panel 1    Providers    Provider Role Service   Wislon Salamanca MD Primary      Procedures    Procedure Laterality Anesthesia Region   Hysteroscopic Polypectomy N/A MAC with Local Vagina                  Requested date:   Location:  OR   Patient class:      Pre-op diagnoses: Endometrial polyp     Scheduling Instructions    Additional Instructions for the Case  Procedure notes:    Procedure length:  30 minutes  Diagnosis:  Endometrial polyp  Request additional equipment:    Location:  [facility]  Sterilization consent signed:  NA  OR staff assist:  no  H&P to be completed by PCP  Post op appointment needed:  no  Scheduling instructions:  Wants it as soon as possible   SURGERY SCHEDULING AND PRECERTIFICATION    Medical Record Number: [id number]  Lu Syed  YOB: 1995   Phone: [phone] (home)   Primary Provider: Valeria Ward    Reason for Admit:  ICD-10 CODE:  N84.0    Surgeon: Wilson Salamanca MD  Surgical Procedure: Hysteroscopic Polypectomy    Date of Surgery 10/13 Time of Surgery 1:40pm  Surgery to be performed at:  [facility]   Status: Outpatient  Type of Anesthesia Anticipated: Local with MAC    Sterilization consent:  Not applicable to procedure being performed.    Pre-Op: On 10/04 with Dr Ward at [facility]  COVID testing:  Per Provider's discretion Covid testing is not indicated.     Post-Op:  N/A    Pre-certification routed to Financial Counselors:  Auto routes via Case Request    Surgery packet mailed to patient's home address: Yes  Patient instructed NPO 12 hours prior to surgery, arrive according to the time the nurse gives patient when called prior to surgery, must have a .  Patient understood and agrees to the plan.      Requestor:  Shira YANEZ  Rudy     Location:  [facility]'s [phone]

## 2023-10-04 ENCOUNTER — OFFICE VISIT (OUTPATIENT)
Dept: FAMILY MEDICINE | Facility: CLINIC | Age: 28
End: 2023-10-04
Payer: COMMERCIAL

## 2023-10-04 VITALS
HEIGHT: 64 IN | RESPIRATION RATE: 16 BRPM | TEMPERATURE: 98.6 F | SYSTOLIC BLOOD PRESSURE: 115 MMHG | WEIGHT: 136.8 LBS | BODY MASS INDEX: 23.35 KG/M2 | HEART RATE: 67 BPM | OXYGEN SATURATION: 98 % | DIASTOLIC BLOOD PRESSURE: 74 MMHG

## 2023-10-04 DIAGNOSIS — N84.0 ENDOMETRIAL POLYP: ICD-10-CM

## 2023-10-04 DIAGNOSIS — Z01.818 PREOP GENERAL PHYSICAL EXAM: Primary | ICD-10-CM

## 2023-10-04 PROCEDURE — 99214 OFFICE O/P EST MOD 30 MIN: CPT | Performed by: FAMILY MEDICINE

## 2023-10-04 ASSESSMENT — PAIN SCALES - GENERAL: PAINLEVEL: MILD PAIN (2)

## 2023-10-04 NOTE — PROGRESS NOTES
[facility]  [address] [address]  Phone: [phone]  Primary Provider: Valeria Crump  Pre-op Performing Provider: VALERIA CRUMP      PREOPERATIVE EVALUATION:  Today's date: 10/4/2023    Margarita is a 28 year old female who presents for a preoperative evaluation.      10/4/2023    11:11 AM   Additional Questions   Roomed by Jonathon       Surgical Information:  Surgery/Procedure: Hysteroscopic polypectomy   Surgery Location: [place]   Surgeon: Dr. Salamanca   Surgery Date: 10/13/23  Time of Surgery: 1:40  Where patient plans to recover: At home with family  Fax number for surgical facility: Note does not need to be faxed, will be available electronically in Epic.    Assessment & Plan     The proposed surgical procedure is considered LOW risk.    (Z01.818) Preop general physical exam  (primary encounter diagnosis)  (N84.0) Endometrial polyp  Comment: Pt with possible endometrial polyp requiring hysteroscopy with polypectomy  Plan: medically stable for procedure            - No identified additional risk factors other than previously addressed    Antiplatelet or Anticoagulation Medication Instructions:   - Patient is on no antiplatelet or anticoagulation medications.    Additional Medication Instructions:  Patient is on no additional chronic medications    RECOMMENDATION:  APPROVAL GIVEN to proceed with proposed procedure, without further diagnostic evaluation.            Subjective       HPI related to upcoming procedure: Pt with possible endometrial polyp requiring hysteroscopy with polypectomy        10/4/2023    11:05 AM   Preop Questions   1. Have you ever had a heart attack or stroke? No   2. Have you ever had surgery on your heart or blood vessels, such as a stent placement, a coronary artery bypass, or surgery on an artery in your head, neck, heart, or legs? No   3. Do you have chest pain with activity? No   4. Do you have a history of  heart failure? No    5. Do you currently have a cold, bronchitis or symptoms of other infection? No   6. Do you have a cough, shortness of breath, or wheezing? No   7. Do you or anyone in your family have previous history of blood clots? No   8. Do you or does anyone in your family have a serious bleeding problem such as prolonged bleeding following surgeries or cuts? No   9. Have you ever had problems with anemia or been told to take iron pills? YES - during pregnancy   10. Have you had any abnormal blood loss such as black, tarry or bloody stools, or abnormal vaginal bleeding? No   11. Have you ever had a blood transfusion? No   12. Are you willing to have a blood transfusion if it is medically needed before, during, or after your surgery? Yes   13. Have you or any of your relatives ever had problems with anesthesia? No   14. Do you have sleep apnea, excessive snoring or daytime drowsiness? No   15. Do you have any artifical heart valves or other implanted medical devices like a pacemaker, defibrillator, or continuous glucose monitor? No   16. Do you have artificial joints? No   17. Are you allergic to latex? No   18. Is there any chance that you may be pregnant? No       Health Care Directive:  Patient does not have a Health Care Directive or Living Will: Discussed advance care planning with patient; information given to patient to review.    Preoperative Review of :   reviewed - no record of controlled substances prescribed.      Status of Chronic Conditions:  See problem list for active medical problems.  Problems all longstanding and stable, except as noted/documented.  See ROS for pertinent symptoms related to these conditions.    Review of Systems  Constitutional, neuro, ENT, endocrine, pulmonary, cardiac, gastrointestinal, genitourinary, musculoskeletal, integument and psychiatric systems are negative, except as otherwise noted.    Patient Active Problem List    Diagnosis Date Noted    Endometrial polyp 10/02/2023      "Priority: Medium      Past Medical History:   Diagnosis Date    Encounter for supervision of other normal pregnancy in first trimester 2019     Past Surgical History:   Procedure Laterality Date     SECTION  , 2015    x 2     No current outpatient medications on file.       No Known Allergies     Social History     Tobacco Use    Smoking status: Never    Smokeless tobacco: Never   Substance Use Topics    Alcohol use: No       History   Drug Use No         Objective     /74   Pulse 67   Temp 98.6  F (37  C) (Oral)   Resp 16   Ht 1.613 m (5' 3.5\")   Wt 62.1 kg (136 lb 12.8 oz)   LMP 10/03/2023 (Exact Date)   SpO2 98%   BMI 23.85 kg/m      Physical Exam    GENERAL APPEARANCE: healthy, alert and no distress     EYES: EOMI, PERRL     HENT: ear canals and TM's normal and nose and mouth without ulcers or lesions     NECK: no adenopathy, no asymmetry, masses, or scars and thyroid normal to palpation     RESP: lungs clear to auscultation - no rales, rhonchi or wheezes     CV: regular rates and rhythm, normal S1 S2, no S3 or S4 and no murmur, click or rub     ABDOMEN:  soft, nontender, no HSM or masses and bowel sounds normal     MS: extremities normal- no gross deformities noted, no evidence of inflammation in joints, FROM in all extremities.     SKIN: no suspicious lesions or rashes     NEURO: Normal strength and tone, sensory exam grossly normal, mentation intact, speech normal, and cranial nerves 2-12 intact     PSYCH: mentation appears normal. and affect normal/bright    No results for input(s): HGB, PLT, INR, NA, POTASSIUM, CR, A1C in the last 57801 hours.     Diagnostics:  No labs were ordered during this visit.   No EKG required for low risk surgery (cataract, skin procedure, breast biopsy, etc).    Revised Cardiac Risk Index (RCRI):  The patient has the following serious cardiovascular risks for perioperative complications:   - No serious cardiac risks = 0 points     RCRI Interpretation: " 0 points: Class I (very low risk - 0.4% complication rate)         Signed Electronically by: Valeria Ward MD  Copy of this evaluation report is provided to requesting physician.

## 2023-10-04 NOTE — PATIENT INSTRUCTIONS
Preparing for Your Surgery  Getting started  A nurse will call you to review your health history and instructions. They will give you an arrival time based on your scheduled surgery time. Please be ready to share:  Your doctor's clinic name and phone number  Your medical, surgical, and anesthesia history  A list of allergies and sensitivities  A list of medicines, including herbal treatments and over-the-counter drugs  Whether the patient has a legal guardian (ask how to send us the papers in advance)  Please tell us if you're pregnant--or if there's any chance you might be pregnant. Some surgeries may injure a fetus (unborn baby), so they require a pregnancy test. Surgeries that are safe for a fetus don't always need a test, and you can choose whether to have one.   If you have a child who's having surgery, please ask for a copy of Preparing for Your Child's Surgery.    Preparing for surgery  Within 10 to 30 days of surgery: Have a pre-op exam (sometimes called an H&P, or History and Physical). This can be done at a clinic or pre-operative center.  If you're having a , you may not need this exam. Talk to your care team.  At your pre-op exam, talk to your care team about all medicines you take. If you need to stop any medicines before surgery, ask when to start taking them again.  We do this for your safety. Many medicines can make you bleed too much during surgery. Some change how well surgery (anesthesia) drugs work.  Call your insurance company to let them know you're having surgery. (If you don't have insurance, call [phone].)  Call your clinic if there's any change in your health. This includes signs of a cold or flu (sore throat, runny nose, cough, rash, fever). It also includes a scrape or scratch near the surgery site.  If you have questions on the day of surgery, call your hospital or surgery center.  Eating and drinking guidelines  For your safety: Unless your surgeon tells you otherwise,  follow the guidelines below.  Eat and drink as usual until 8 hours before you arrive for surgery. After that, no food or milk.  Drink clear liquids until 2 hours before you arrive. These are liquids you can see through, like water, Gatorade, and Propel Water. They also include plain black coffee and tea (no cream or milk), candy, and breath mints. You can spit out gum when you arrive.  If you drink alcohol: Stop drinking it the night before surgery.  If your care team tells you to take medicine on the morning of surgery, it's okay to take it with a sip of water.  Preventing infection  Shower or bathe the night before and morning of your surgery. Follow the instructions your clinic gave you. (If no instructions, use regular soap.)  Don't shave or clip hair near your surgery site. We'll remove the hair if needed.  Don't smoke or vape the morning of surgery. You may chew nicotine gum up to 2 hours before surgery. A nicotine patch is okay.  Note: Some surgeries require you to completely quit smoking and nicotine. Check with your surgeon.  Your care team will make every effort to keep you safe from infection. We will:  Clean our hands often with soap and water (or an alcohol-based hand rub).  Clean the skin at your surgery site with a special soap that kills germs.  Give you a special gown to keep you warm. (Cold raises the risk of infection.)  Wear special hair covers, masks, gowns and gloves during surgery.  Give antibiotic medicine, if prescribed. Not all surgeries need antibiotics.  What to bring on the day of surgery  Photo ID and insurance card  Copy of your health care directive, if you have one  Glasses and hearing aids (bring cases)  You can't wear contacts during surgery  Inhaler and eye drops, if you use them (tell us about these when you arrive)  CPAP machine or breathing device, if you use them  A few personal items, if spending the night  If you have . . .  A pacemaker, ICD (cardiac defibrillator) or other  implant: Bring the ID card.  An implanted stimulator: Bring the remote control.  A legal guardian: Bring a copy of the certified (court-stamped) guardianship papers.  Please remove any jewelry, including body piercings. Leave jewelry and other valuables at home.  If you're going home the day of surgery  You must have a responsible adult drive you home. They should stay with you overnight as well.  If you don't have someone to stay with you, and you aren't safe to go home alone, we may keep you overnight. Insurance often won't pay for this.  After surgery  If it's hard to control your pain or you need more pain medicine, please call your surgeon's office.  Questions?   If you have any questions for your care team, list them here: _________________________________________________________________________________________________________________________________________________________________________ ____________________________________ ____________________________________ ____________________________________  For informational purposes only. Not to replace the advice of your health care provider. Copyright   2003, 2019 [employer] [facility]. All rights reserved. Clinically reviewed by Kate Edmond MD. [employer]ART[employer] 576417 - REV 12/22.    How to Take Your Medication Before Surgery  - no chronic medications

## 2023-10-10 ENCOUNTER — ANESTHESIA EVENT (OUTPATIENT)
Dept: SURGERY | Facility: AMBULATORY SURGERY CENTER | Age: 28
End: 2023-10-10
Payer: COMMERCIAL

## 2023-10-10 RX ORDER — ONDANSETRON 4 MG/1
4 TABLET, ORALLY DISINTEGRATING ORAL EVERY 30 MIN PRN
Status: CANCELLED | OUTPATIENT
Start: 2023-10-10

## 2023-10-10 RX ORDER — ONDANSETRON 2 MG/ML
4 INJECTION INTRAMUSCULAR; INTRAVENOUS EVERY 30 MIN PRN
Status: CANCELLED | OUTPATIENT
Start: 2023-10-10

## 2023-10-10 RX ORDER — LABETALOL HYDROCHLORIDE 5 MG/ML
10 INJECTION, SOLUTION INTRAVENOUS
Status: CANCELLED | OUTPATIENT
Start: 2023-10-10

## 2023-10-10 RX ORDER — FENTANYL CITRATE 50 UG/ML
25 INJECTION, SOLUTION INTRAMUSCULAR; INTRAVENOUS
Status: CANCELLED | OUTPATIENT
Start: 2023-10-10

## 2023-10-10 RX ORDER — FENTANYL CITRATE 50 UG/ML
25 INJECTION, SOLUTION INTRAMUSCULAR; INTRAVENOUS EVERY 5 MIN PRN
Status: CANCELLED | OUTPATIENT
Start: 2023-10-10

## 2023-10-10 RX ORDER — ALBUTEROL SULFATE 0.83 MG/ML
2.5 SOLUTION RESPIRATORY (INHALATION) EVERY 4 HOURS PRN
Status: CANCELLED | OUTPATIENT
Start: 2023-10-10

## 2023-10-10 RX ORDER — MEPERIDINE HYDROCHLORIDE 25 MG/ML
12.5 INJECTION INTRAMUSCULAR; INTRAVENOUS; SUBCUTANEOUS EVERY 5 MIN PRN
Status: CANCELLED | OUTPATIENT
Start: 2023-10-10

## 2023-10-10 RX ORDER — HALOPERIDOL 5 MG/ML
1 INJECTION INTRAMUSCULAR
Status: CANCELLED | OUTPATIENT
Start: 2023-10-10

## 2023-10-10 RX ORDER — DIMENHYDRINATE 50 MG/ML
25 INJECTION, SOLUTION INTRAMUSCULAR; INTRAVENOUS
Status: CANCELLED | OUTPATIENT
Start: 2023-10-10

## 2023-10-10 RX ORDER — FENTANYL CITRATE 50 UG/ML
50 INJECTION, SOLUTION INTRAMUSCULAR; INTRAVENOUS EVERY 5 MIN PRN
Status: CANCELLED | OUTPATIENT
Start: 2023-10-10

## 2023-10-10 RX ORDER — HYDRALAZINE HYDROCHLORIDE 20 MG/ML
2.5-5 INJECTION INTRAMUSCULAR; INTRAVENOUS EVERY 10 MIN PRN
Status: CANCELLED | OUTPATIENT
Start: 2023-10-10

## 2023-10-10 RX ORDER — OXYCODONE HYDROCHLORIDE 5 MG/1
5 TABLET ORAL
Status: CANCELLED | OUTPATIENT
Start: 2023-10-10

## 2023-10-10 RX ORDER — HYDROXYZINE HYDROCHLORIDE 25 MG/1
25 TABLET, FILM COATED ORAL EVERY 6 HOURS PRN
Status: CANCELLED | OUTPATIENT
Start: 2023-10-10

## 2023-10-10 RX ORDER — ACETAMINOPHEN 325 MG/1
975 TABLET ORAL ONCE
Status: CANCELLED | OUTPATIENT
Start: 2023-10-10 | End: 2023-10-10

## 2023-10-10 RX ORDER — SODIUM CHLORIDE, SODIUM LACTATE, POTASSIUM CHLORIDE, CALCIUM CHLORIDE 600; 310; 30; 20 MG/100ML; MG/100ML; MG/100ML; MG/100ML
INJECTION, SOLUTION INTRAVENOUS CONTINUOUS
Status: CANCELLED | OUTPATIENT
Start: 2023-10-10

## 2023-10-10 RX ORDER — DEXAMETHASONE SODIUM PHOSPHATE 4 MG/ML
4 INJECTION, SOLUTION INTRA-ARTICULAR; INTRALESIONAL; INTRAMUSCULAR; INTRAVENOUS; SOFT TISSUE
Status: CANCELLED | OUTPATIENT
Start: 2023-10-10

## 2023-10-10 RX ORDER — KETOROLAC TROMETHAMINE 30 MG/ML
15 INJECTION, SOLUTION INTRAMUSCULAR; INTRAVENOUS
Status: CANCELLED | OUTPATIENT
Start: 2023-10-10

## 2023-10-10 RX ORDER — LORAZEPAM 2 MG/ML
.5-1 INJECTION INTRAMUSCULAR
Status: CANCELLED | OUTPATIENT
Start: 2023-10-10

## 2023-10-10 RX ORDER — OXYCODONE HYDROCHLORIDE 5 MG/1
10 TABLET ORAL
Status: CANCELLED | OUTPATIENT
Start: 2023-10-10

## 2023-10-13 ENCOUNTER — ANESTHESIA (OUTPATIENT)
Dept: SURGERY | Facility: AMBULATORY SURGERY CENTER | Age: 28
End: 2023-10-13
Payer: COMMERCIAL

## 2023-10-13 ENCOUNTER — HOSPITAL ENCOUNTER (OUTPATIENT)
Facility: AMBULATORY SURGERY CENTER | Age: 28
Discharge: HOME | End: 2023-10-13
Attending: OBSTETRICS & GYNECOLOGY | Admitting: OBSTETRICS & GYNECOLOGY
Payer: COMMERCIAL

## 2023-10-13 VITALS
WEIGHT: 135 LBS | HEART RATE: 71 BPM | DIASTOLIC BLOOD PRESSURE: 66 MMHG | OXYGEN SATURATION: 100 % | SYSTOLIC BLOOD PRESSURE: 110 MMHG | BODY MASS INDEX: 23.54 KG/M2 | RESPIRATION RATE: 16 BRPM | TEMPERATURE: 97.8 F

## 2023-10-13 LAB
HCG INTACT+B SERPL-ACNC: <1 MIU/ML
HOLD SPECIMEN: NORMAL

## 2023-10-13 PROCEDURE — 84702 CHORIONIC GONADOTROPIN TEST: CPT | Performed by: OBSTETRICS & GYNECOLOGY

## 2023-10-13 PROCEDURE — G8918 PT W/O PREOP ORDER IV AB PRO: HCPCS

## 2023-10-13 PROCEDURE — 58558 HYSTEROSCOPY BIOPSY: CPT | Performed by: OBSTETRICS & GYNECOLOGY

## 2023-10-13 PROCEDURE — 88305 TISSUE EXAM BY PATHOLOGIST: CPT | Performed by: STUDENT IN AN ORGANIZED HEALTH CARE EDUCATION/TRAINING PROGRAM

## 2023-10-13 PROCEDURE — 58558 HYSTEROSCOPY BIOPSY: CPT

## 2023-10-13 PROCEDURE — G8907 PT DOC NO EVENTS ON DISCHARG: HCPCS

## 2023-10-13 RX ORDER — FENTANYL CITRATE 50 UG/ML
INJECTION, SOLUTION INTRAMUSCULAR; INTRAVENOUS PRN
Status: DISCONTINUED | OUTPATIENT
Start: 2023-10-13 | End: 2023-10-13

## 2023-10-13 RX ORDER — LIDOCAINE 40 MG/G
CREAM TOPICAL
Status: DISCONTINUED | OUTPATIENT
Start: 2023-10-13 | End: 2023-10-14 | Stop reason: HOSPADM

## 2023-10-13 RX ORDER — DEXAMETHASONE SODIUM PHOSPHATE 4 MG/ML
INJECTION, SOLUTION INTRA-ARTICULAR; INTRALESIONAL; INTRAMUSCULAR; INTRAVENOUS; SOFT TISSUE PRN
Status: DISCONTINUED | OUTPATIENT
Start: 2023-10-13 | End: 2023-10-13

## 2023-10-13 RX ORDER — ONDANSETRON 2 MG/ML
INJECTION INTRAMUSCULAR; INTRAVENOUS PRN
Status: DISCONTINUED | OUTPATIENT
Start: 2023-10-13 | End: 2023-10-13

## 2023-10-13 RX ORDER — IBUPROFEN 400 MG/1
800 TABLET, FILM COATED ORAL ONCE
Status: CANCELLED | OUTPATIENT
Start: 2023-10-13 | End: 2023-10-13

## 2023-10-13 RX ORDER — ACETAMINOPHEN 325 MG/1
975 TABLET ORAL ONCE
Status: CANCELLED | OUTPATIENT
Start: 2023-10-13 | End: 2023-10-13

## 2023-10-13 RX ORDER — KETOROLAC TROMETHAMINE 30 MG/ML
INJECTION, SOLUTION INTRAMUSCULAR; INTRAVENOUS PRN
Status: DISCONTINUED | OUTPATIENT
Start: 2023-10-13 | End: 2023-10-13

## 2023-10-13 RX ORDER — LIDOCAINE HYDROCHLORIDE 20 MG/ML
INJECTION, SOLUTION INFILTRATION; PERINEURAL PRN
Status: DISCONTINUED | OUTPATIENT
Start: 2023-10-13 | End: 2023-10-13

## 2023-10-13 RX ORDER — SODIUM CHLORIDE, SODIUM LACTATE, POTASSIUM CHLORIDE, CALCIUM CHLORIDE 600; 310; 30; 20 MG/100ML; MG/100ML; MG/100ML; MG/100ML
INJECTION, SOLUTION INTRAVENOUS CONTINUOUS
Status: DISCONTINUED | OUTPATIENT
Start: 2023-10-13 | End: 2023-10-14 | Stop reason: HOSPADM

## 2023-10-13 RX ORDER — ACETAMINOPHEN 325 MG/1
975 TABLET ORAL ONCE
Status: DISCONTINUED | OUTPATIENT
Start: 2023-10-13 | End: 2023-10-14 | Stop reason: HOSPADM

## 2023-10-13 RX ORDER — OXYCODONE HYDROCHLORIDE 5 MG/1
5 TABLET ORAL
Status: CANCELLED | OUTPATIENT
Start: 2023-10-13

## 2023-10-13 RX ORDER — BUPIVACAINE HYDROCHLORIDE AND EPINEPHRINE 5; 5 MG/ML; UG/ML
INJECTION, SOLUTION PERINEURAL PRN
Status: DISCONTINUED | OUTPATIENT
Start: 2023-10-13 | End: 2023-10-13 | Stop reason: HOSPADM

## 2023-10-13 RX ORDER — PROPOFOL 10 MG/ML
INJECTION, EMULSION INTRAVENOUS PRN
Status: DISCONTINUED | OUTPATIENT
Start: 2023-10-13 | End: 2023-10-13

## 2023-10-13 RX ORDER — PROPOFOL 10 MG/ML
INJECTION, EMULSION INTRAVENOUS CONTINUOUS PRN
Status: DISCONTINUED | OUTPATIENT
Start: 2023-10-13 | End: 2023-10-13

## 2023-10-13 RX ADMIN — FENTANYL CITRATE 25 MCG: 50 INJECTION, SOLUTION INTRAMUSCULAR; INTRAVENOUS at 12:03

## 2023-10-13 RX ADMIN — LIDOCAINE HYDROCHLORIDE 40 MG: 20 INJECTION, SOLUTION INFILTRATION; PERINEURAL at 11:58

## 2023-10-13 RX ADMIN — PROPOFOL 100 MCG/KG/MIN: 10 INJECTION, EMULSION INTRAVENOUS at 11:58

## 2023-10-13 RX ADMIN — DEXAMETHASONE SODIUM PHOSPHATE 4 MG: 4 INJECTION, SOLUTION INTRA-ARTICULAR; INTRALESIONAL; INTRAMUSCULAR; INTRAVENOUS; SOFT TISSUE at 12:06

## 2023-10-13 RX ADMIN — KETOROLAC TROMETHAMINE 30 MG: 30 INJECTION, SOLUTION INTRAMUSCULAR; INTRAVENOUS at 12:12

## 2023-10-13 RX ADMIN — SODIUM CHLORIDE, SODIUM LACTATE, POTASSIUM CHLORIDE, CALCIUM CHLORIDE: 600; 310; 30; 20 INJECTION, SOLUTION INTRAVENOUS at 11:07

## 2023-10-13 RX ADMIN — FENTANYL CITRATE 25 MCG: 50 INJECTION, SOLUTION INTRAMUSCULAR; INTRAVENOUS at 12:07

## 2023-10-13 RX ADMIN — PROPOFOL 80 MG: 10 INJECTION, EMULSION INTRAVENOUS at 11:58

## 2023-10-13 RX ADMIN — FENTANYL CITRATE 50 MCG: 50 INJECTION, SOLUTION INTRAMUSCULAR; INTRAVENOUS at 11:55

## 2023-10-13 RX ADMIN — ONDANSETRON 4 MG: 2 INJECTION INTRAMUSCULAR; INTRAVENOUS at 12:07

## 2023-10-13 NOTE — OP NOTE
Procedure:  Hysteroscopic Polypectomy  Pre-Op Dx:  Endometrial Polyp  Post-Op Dx:   same  Surgeon: Wilson Salamanca MD FACOG  Assist:  [facility] staff  Anesthesia:  Local/MAC  Findings: Small endometrial polyp  Procedure: The patient was taken to the operating room where anesthesia was induced and found to be adequate.  She was then placed in dorsal lithotomy position and prepped and draped in a sterile fashion.    The cervix was then visualized and grasped with a single toothed tenaculum.  The cervix was infiltrated with 0.5%Marcaine with epi.  The uterus was sounded to a depth of 7 cm and dilated to accept a 30 degree hysteroscope.  The endometrial cavity is distended using normal saline and visualized.    The endometrium is normal in appearance with a small polyp along the lower endometrial cavity wall.  The Myosure is placed and the polyp removed.    Good hemostasis was noted.  The cavity was then inspected and noted to be otherwise normal.   The scope and tenaculum are removed.  The patient tolerated the procedure well.  Sponge, lap and needle counts are correct X2  Specimen:  Endometrial polyp  EBL:  5cc  Fluid balance: 80cc  The patient was taken to the recovery room in stable condition.

## 2023-10-13 NOTE — ANESTHESIA CARE TRANSFER NOTE
Patient: Lu Syed    Procedure: Procedure(s):  Hysteroscopic Polypectomy       Diagnosis: Endometrial polyp [N84.0]  Diagnosis Additional Information: No value filed.    Anesthesia Type:   MAC     Note:    Oropharynx: oropharynx clear of all foreign objects and spontaneously breathing  Level of Consciousness: awake and drowsy  Oxygen Supplementation: room air    Independent Airway: airway patency satisfactory and stable  Dentition: dentition unchanged  Vital Signs Stable: post-procedure vital signs reviewed and stable  Report to RN Given: handoff report given  Patient transferred to: PACU    Handoff Report: Identifed the Patient, Identified the Reponsible Provider, Reviewed the pertinent medical history, Discussed the surgical course, Reviewed Intra-OP anesthesia mangement and issues during anesthesia, Set expectations for post-procedure period and Allowed opportunity for questions and acknowledgement of understanding      Vitals:  Vitals Value Taken Time   BP     Temp 97.8    Pulse     Resp     SpO2 100 % 10/13/23 1218   Vitals shown include unfiled device data.    Electronically Signed By: RANDI Dasilva CRNA  October 13, 2023  12:19 PM

## 2023-10-13 NOTE — ANESTHESIA PREPROCEDURE EVALUATION
"Anesthesia Pre-Procedure Evaluation    Patient: Lu Syed   MRN: [id number] : 1995        Procedure : Procedure(s):  Hysteroscopic Polypectomy          Past Medical History:   Diagnosis Date    Encounter for supervision of other normal pregnancy in first trimester 2019      Past Surgical History:   Procedure Laterality Date     SECTION  , 2015    x 2      No Known Allergies   Social History     Tobacco Use    Smoking status: Never    Smokeless tobacco: Never   Substance Use Topics    Alcohol use: No      Wt Readings from Last 1 Encounters:   10/04/23 62.1 kg (136 lb 12.8 oz)        Anesthesia Evaluation            ROS/MED HX  ENT/Pulmonary:  - neg pulmonary ROS     Neurologic:  - neg neurologic ROS     Cardiovascular:  - neg cardiovascular ROS     METS/Exercise Tolerance: >4 METS    Hematologic:  - neg hematologic  ROS     Musculoskeletal:  - neg musculoskeletal ROS     GI/Hepatic:  - neg GI/hepatic ROS     Renal/Genitourinary:  - neg Renal ROS     Endo:  - neg endo ROS     Psychiatric/Substance Use:  - neg psychiatric ROS     Infectious Disease:  - neg infectious disease ROS     Malignancy:  - neg malignancy ROS     Other:  - neg other ROS          Physical Exam    Airway  airway exam normal      Mallampati: I   TM distance: > 3 FB   Neck ROM: full   Mouth opening: > 3 cm    Respiratory Devices and Support         Dental       (+) Completely normal teeth      Cardiovascular   cardiovascular exam normal       Rhythm and rate: regular and normal     Pulmonary   pulmonary exam normal        breath sounds clear to auscultation           OUTSIDE LABS:  CBC:   Lab Results   Component Value Date    WBC 10.3 2019    WBC 11.5 (H) 2016    HGB 12.3 2020    HGB 11.5 (L) 05/15/2019    HCT 35.0 2019    HCT 35.6 2016     2019     2016     BMP: No results found for: \"NA\", \"POTASSIUM\", \"CHLORIDE\", \"CO2\", \"BUN\", \"CR\", \"GLC\"  COAGS: No results " "found for: \"PTT\", \"INR\", \"FIBR\"  POC: No results found for: \"BGM\", \"HCG\", \"HCGS\"  HEPATIC: No results found for: \"ALBUMIN\", \"PROTTOTAL\", \"ALT\", \"AST\", \"GGT\", \"ALKPHOS\", \"BILITOTAL\", \"BILIDIRECT\", \"SONI\"  OTHER:   Lab Results   Component Value Date    TSH 1.30 02/06/2019       Anesthesia Plan    ASA Status:  1    NPO Status:  NPO Appropriate    Anesthesia Type: MAC.     - Reason for MAC: Deep or markedly invasive procedure (G8)   Induction: Propofol.   Maintenance: N/A.        Consents    Anesthesia Plan(s) and associated risks, benefits, and realistic alternatives discussed. Questions answered and patient/representative(s) expressed understanding.     - Discussed:     - Discussed with:  Patient       Use of blood products discussed: No .     Postoperative Care    Pain management: Multi-modal analgesia, Oral pain medications.   PONV prophylaxis: Ondansetron (or other 5HT-3), Dexamethasone or Solumedrol     Comments:                Ruddy Hernández, DO  "

## 2023-10-13 NOTE — ANESTHESIA POSTPROCEDURE EVALUATION
Patient: Lu Syed    Procedure: Procedure(s):  Hysteroscopic Polypectomy       Anesthesia Type:  MAC    Note:  Disposition: Outpatient   Postop Pain Control: Uneventful            Sign Out: Well controlled pain   PONV: No   Neuro/Psych: Uneventful            Sign Out: Acceptable/Baseline neuro status   Airway/Respiratory: Uneventful            Sign Out: Acceptable/Baseline resp. status   CV/Hemodynamics: Uneventful            Sign Out: Acceptable CV status; No obvious hypovolemia; No obvious fluid overload   Other NRE: NONE   DID A NON-ROUTINE EVENT OCCUR? No           Last vitals:  Vitals Value Taken Time   /57 10/13/23 1219   Temp 97.8  F (36.6  C) 10/13/23 1217   Pulse 81 10/13/23 1219   Resp 16 10/13/23 1217   SpO2 99 % 10/13/23 1231   Vitals shown include unfiled device data.    Electronically Signed By: Ruddy Hernández DO  October 13, 2023  12:43 PM

## 2023-10-13 NOTE — DISCHARGE INSTRUCTIONS
Gynecology Outpatient Post-operative Instructions    1.Dressing (if present) may be removed tomorrow.  Leave incision uncovered.    2.You may shower as normal tomorrow.    3. You may eat as tolerated today.    4. You may take ibuprofen over the counter as tolerated.    5. Tomorrow, lifting and physical activity as tolerated.    6.  You may drive when you are no longer requiring narcotic pain medication.    7. You may return to work/school when you feel able.    8.  Please contact my office with any problems.    9.  I would like to touch base in 2 weeks.  Please either send me a [employer] message or call our office and let us know how you are doing.     No Ibuprofen/Motrin/Advil before 8pm.     [facility]  Same-Day Surgery   Adult Discharge Orders & Instructions   For 24 hours after surgery  Get plenty of rest.  A responsible adult must stay with you for at least 24 hours after you leave the hospital.   Do not drive or use heavy equipment.  If you have weakness or tingling, don't drive or use heavy equipment until this feeling goes away.  Do not drink alcohol.  Avoid strenuous or risky activities.  Ask for help when climbing stairs.   You may feel lightheaded.  IF so, sit for a few minutes before standing.  Have someone help you get up.   If you have nausea (feel sick to your stomach): Drink only clear liquids such as apple juice, ginger ale, broth or 7-Up.  Rest may also help.  Be sure to drink enough fluids.  Move to a regular diet as you feel able.  You may have a slight fever. Call the doctor if your fever is over 100 F (37.7 C) (taken under the tongue) or lasts longer than 24 hours.  You may have a dry mouth, a sore throat, muscle aches or trouble sleeping.  These should go away after 24 hours.  Do not make important or legal decisions.   Call your doctor for any of the followin.  Signs of infection (fever, growing tenderness at the surgery site, a large amount of drainage or  bleeding, severe pain, foul-smelling drainage, redness, swelling).  2. It has been over 8 to 10 hours since surgery and you are still not able to urinate (pass water).  3.  Headache for over 24 hours.

## 2023-10-17 LAB
PATH REPORT.COMMENTS IMP SPEC: NORMAL
PATH REPORT.COMMENTS IMP SPEC: NORMAL
PATH REPORT.FINAL DX SPEC: NORMAL
PATH REPORT.GROSS SPEC: NORMAL
PATH REPORT.MICROSCOPIC SPEC OTHER STN: NORMAL
PATH REPORT.RELEVANT HX SPEC: NORMAL
PHOTO IMAGE: NORMAL

## 2023-10-30 ENCOUNTER — E-VISIT (OUTPATIENT)
Dept: FAMILY MEDICINE | Facility: CLINIC | Age: 28
End: 2023-10-30
Payer: COMMERCIAL

## 2023-10-30 DIAGNOSIS — N84.0 ENDOMETRIAL POLYP: Primary | ICD-10-CM

## 2023-10-30 PROCEDURE — 99422 OL DIG E/M SVC 11-20 MIN: CPT | Performed by: FAMILY MEDICINE

## 2024-07-18 ENCOUNTER — PATIENT OUTREACH (OUTPATIENT)
Dept: CARE COORDINATION | Facility: CLINIC | Age: 29
End: 2024-07-18
Payer: COMMERCIAL

## 2024-08-01 ENCOUNTER — PATIENT OUTREACH (OUTPATIENT)
Dept: CARE COORDINATION | Facility: CLINIC | Age: 29
End: 2024-08-01
Payer: COMMERCIAL

## 2024-09-10 NOTE — NURSING NOTE
"Chief Complaint   Patient presents with     Prenatal Care       Initial /67  Pulse 93  Temp 97.5  F (36.4  C) (Oral)  Wt 163 lb (73.9 kg)  LMP 06/10/2016 (Exact Date)  SpO2 95%  BMI 28.2 kg/m2 Estimated body mass index is 28.2 kg/(m^2) as calculated from the following:    Height as of 8/3/16: 5' 3.75\" (1.619 m).    Weight as of this encounter: 163 lb (73.9 kg).  Medication Reconciliation: complete    Carli Worthy MA    " Detail Level: Zone

## 2024-11-24 ENCOUNTER — HEALTH MAINTENANCE LETTER (OUTPATIENT)
Age: 29
End: 2024-11-24

## 2024-12-02 LAB
ABO/RH(D): NORMAL
ANTIBODY SCREEN: NEGATIVE
SPECIMEN EXPIRATION DATE: NORMAL

## 2024-12-03 ENCOUNTER — PRENATAL OFFICE VISIT (OUTPATIENT)
Dept: FAMILY MEDICINE | Facility: CLINIC | Age: 29
End: 2024-12-03
Payer: COMMERCIAL

## 2024-12-03 VITALS
HEART RATE: 80 BPM | DIASTOLIC BLOOD PRESSURE: 75 MMHG | BODY MASS INDEX: 25.34 KG/M2 | HEIGHT: 63 IN | OXYGEN SATURATION: 96 % | RESPIRATION RATE: 14 BRPM | TEMPERATURE: 97.6 F | SYSTOLIC BLOOD PRESSURE: 115 MMHG | WEIGHT: 143 LBS

## 2024-12-03 DIAGNOSIS — Z34.81 ENCOUNTER FOR SUPERVISION OF OTHER NORMAL PREGNANCY, FIRST TRIMESTER: Primary | ICD-10-CM

## 2024-12-03 DIAGNOSIS — O34.219 PREVIOUS CESAREAN DELIVERY, ANTEPARTUM CONDITION OR COMPLICATION: ICD-10-CM

## 2024-12-03 LAB
ERYTHROCYTE [DISTWIDTH] IN BLOOD BY AUTOMATED COUNT: 12.8 % (ref 10–15)
HCT VFR BLD AUTO: 36.1 % (ref 35–47)
HGB BLD-MCNC: 11.9 G/DL (ref 11.7–15.7)
MCH RBC QN AUTO: 28.7 PG (ref 26.5–33)
MCHC RBC AUTO-ENTMCNC: 33 G/DL (ref 31.5–36.5)
MCV RBC AUTO: 87 FL (ref 78–100)
PLATELET # BLD AUTO: 429 10E3/UL (ref 150–450)
RBC # BLD AUTO: 4.14 10E6/UL (ref 3.8–5.2)
WBC # BLD AUTO: 13.6 10E3/UL (ref 4–11)

## 2024-12-03 PROCEDURE — 86803 HEPATITIS C AB TEST: CPT | Performed by: FAMILY MEDICINE

## 2024-12-03 PROCEDURE — 36415 COLL VENOUS BLD VENIPUNCTURE: CPT | Performed by: FAMILY MEDICINE

## 2024-12-03 PROCEDURE — 87086 URINE CULTURE/COLONY COUNT: CPT | Performed by: FAMILY MEDICINE

## 2024-12-03 PROCEDURE — 85027 COMPLETE CBC AUTOMATED: CPT | Performed by: FAMILY MEDICINE

## 2024-12-03 PROCEDURE — 82728 ASSAY OF FERRITIN: CPT | Performed by: FAMILY MEDICINE

## 2024-12-03 PROCEDURE — 86901 BLOOD TYPING SEROLOGIC RH(D): CPT | Performed by: FAMILY MEDICINE

## 2024-12-03 PROCEDURE — 86780 TREPONEMA PALLIDUM: CPT | Performed by: FAMILY MEDICINE

## 2024-12-03 PROCEDURE — 86900 BLOOD TYPING SEROLOGIC ABO: CPT | Performed by: FAMILY MEDICINE

## 2024-12-03 PROCEDURE — 86787 VARICELLA-ZOSTER ANTIBODY: CPT | Performed by: FAMILY MEDICINE

## 2024-12-03 PROCEDURE — 84443 ASSAY THYROID STIM HORMONE: CPT | Performed by: FAMILY MEDICINE

## 2024-12-03 PROCEDURE — 87340 HEPATITIS B SURFACE AG IA: CPT | Performed by: FAMILY MEDICINE

## 2024-12-03 PROCEDURE — 87491 CHLMYD TRACH DNA AMP PROBE: CPT | Performed by: FAMILY MEDICINE

## 2024-12-03 PROCEDURE — 86762 RUBELLA ANTIBODY: CPT | Performed by: FAMILY MEDICINE

## 2024-12-03 PROCEDURE — 86850 RBC ANTIBODY SCREEN: CPT | Performed by: FAMILY MEDICINE

## 2024-12-03 PROCEDURE — 87389 HIV-1 AG W/HIV-1&-2 AB AG IA: CPT | Performed by: FAMILY MEDICINE

## 2024-12-03 PROCEDURE — 99459 PELVIC EXAMINATION: CPT | Performed by: FAMILY MEDICINE

## 2024-12-03 PROCEDURE — 99213 OFFICE O/P EST LOW 20 MIN: CPT | Performed by: FAMILY MEDICINE

## 2024-12-03 RX ORDER — ESTRADIOL 2 MG/1
TABLET ORAL
COMMUNITY
Start: 2024-02-10

## 2024-12-03 RX ORDER — FAMOTIDINE 20 MG/1
TABLET, FILM COATED ORAL
COMMUNITY
Start: 2024-02-10

## 2024-12-03 RX ORDER — PROGESTERONE 200 MG/1
CAPSULE ORAL
COMMUNITY
Start: 2024-02-10

## 2024-12-03 RX ORDER — NORGESTIMATE AND ETHINYL ESTRADIOL 0.25-0.035
KIT ORAL
COMMUNITY
Start: 2024-08-11

## 2024-12-03 RX ORDER — LORATADINE 10 MG/1
TABLET ORAL
COMMUNITY
Start: 2024-02-12

## 2024-12-03 RX ORDER — ASPIRIN 81 MG/1
TABLET, COATED ORAL
COMMUNITY
Start: 2024-02-10

## 2024-12-03 RX ORDER — PREDNISONE 10 MG/1
TABLET ORAL
COMMUNITY
Start: 2024-02-10

## 2024-12-03 ASSESSMENT — PAIN SCALES - GENERAL: PAINLEVEL_OUTOF10: NO PAIN (0)

## 2024-12-03 NOTE — PROGRESS NOTES
Lu Syed  is a 29 year old co-habitating who presents to the clinic for a new ob visit.    Estimated Date of Delivery: 2025 is calculated from Patient's last menstrual period was 10/06/2024.   She brown spotting once after workout and activity.   She has had mild nausea. Weigh loss has not occurred.   This was a planned pregnancy.   FOB is involved,     OTHER CONCERNS: + breast ttp, + fatigue    Pregnancy via IVF after endometrial polypectomy 10/2023, also s/p treatment for endometritis.    OB History    Para Term  AB Living   5 4 4 0 0 4   SAB IAB Ectopic Multiple Live Births   0 0 0 0 4      # Outcome Date GA Lbr Torres/2nd Weight Sex Type Anes PTL Lv   5 Current            4 Term 19 39w4d  3.799 kg (8 lb 6 oz) M CS-LTranv   OSCAR      Name: Jesus   3 Term 03/10/17 39w0d  3.289 kg (7 lb 4 oz) M CS-LTranv Spinal  OSCAR      Name: Earle   2 Term  39w0d  3.629 kg (8 lb) M CS-LTranv Spinal N OSCAR      Name: Fredy   1 Term 13 40w5d  3.175 kg (7 lb) F CS-LTranv EPI N OSCAR      Birth Comments: Chorioamniotitis      Complications: Intraamniotic Infection, Failure to Progress in First Stage      Name: Kyung        INFECTION HISTORY  HIV: No  Hepatitis B: No  Hepatitis C: No  Syphilis:  No  Tuberculosis: no   PPD- no  Herpes self: no  Herpes partner:  no  Chlamydia:  no  Gonorrhea:  no  HPV: no  BV:  no  Trichomonis:  no  Chicken Pox:   vaccinated  ====================================================  PERSONAL/SOCIAL HISTORY  Lives lives with their family.  Employment: Full time.  Her job involves moderate activity .  Additional items: None  =====================================================   REVIEW OF SYSTEMS  CONSTITUTIONAL: NEGATIVE for fever, chills  EYES: NEGATIVE for vision changes   RESP: NEGATIVE for significant cough or SOB  CV: NEGATIVE for chest pain, palpitations   GI: NEGATIVE for nausea, abdominal pain, heartburn, or change in bowel habits  : NEGATIVE for  "frequency, dysuria, or hematuria  MUSCULOSKELETAL: NEGATIVE for significant arthralgias or myalgia  NEURO: NEGATIVE for weakness, dizziness or paresthesias or headache  ====================================================  PHYSICAL EXAM:  /75   Pulse 80   Temp 97.6  F (36.4  C) (Tympanic)   Resp 14   Ht 1.6 m (5' 3\")   Wt 64.9 kg (143 lb)   LMP 10/06/2024   SpO2 96%   BMI 25.33 kg/m    BMI- Body mass index is 25.33 kg/m .,     RECOMMENDED WEIGHT GAIN: 15-25 lbs.  GENERAL:  Pleasant pregnant female, alert, well groomed.  SKIN:  Warm and dry, without lesions or rashes  HEAD: Symmetrical features.  EYES:  PERRLA,   MOUTH:  Buccal mucosa pink, moist without lesions.    NECK:  Thyroid without enlargement and nodules.  Lymph nodes not palpable.   LUNGS:  Clear to auscultation.     HEART:  RRR without murmur.  ABDOMEN: Soft without masses , tenderness or organomegaly.  No CVA tenderness. Well healed scar from  section. FHT not checked  MENTAL STATUS:  Alert, oriented x3.  Speech fluent with normal naming, repetition, comprehension.   CRANIAL NERVES:   Pupils are equal, round, reactive to light.  Extraocular movements full.  Visual fields full.  Facial sensation, movement normal.  Palate moves symmetrically.  Tongue midline.  Sternocleidomastoid and trapezius strength intact.    Motor 5/5.  Reflexes 2/4.    G  EXTREMITIES:  No edema. No significant varicosities.   GENITALIA:  BUS WNL, no lesions noted   VAGINA:  Pink, normal rugae and discharge normal and physiologic,   CERVIX:  smooth, without discharge or CMT and parous os,   firm/ closed 3 cm long.  UTERUS: Retroverted, nontender 8 weeks in size.  ADNEXA: Without masses or tenderness  PELVIS:   Adequate, Pelvis proven to -pelvis not tested.  .      =========================================  ASSESSMENT:  (Z34.81) Encounter for supervision of other normal pregnancy, first trimester  (primary encounter diagnosis)  Comment: feeling well  Plan: US OB <14 " Weeks w Transvaginal Single, ABO/Rh         type and screen, CBC with platelets, Rubella         Antibody IgG, Treponema Abs w Reflex to RPR and        Titer, Hepatitis B surface antigen, Hepatitis C        Screen Reflex to HCV RNA Quant and Genotype,         HIV Antigen Antibody Combo, Chlamydia         trachomatis PCR, Urine Culture, TSH with free         T4 reflex, Varicella Zoster Virus Antibody IgG,        Ferritin        Planned repeat  at [place]    (O34.219) Previous  delivery, antepartum condition or complication  Comment: plans repeat  Plan: US OB <14 Weeks w Transvaginal Single           .  PREGNANCY AT RISK? no  ==========================================  PLAN:  Instructed on use of triage nurse line and contacting the on call provider after hours for an urgent need such as fever, vagina bleeding, bladder or vaginal infection, rupture of membranes,  or term labor.    Discussed the indications, uses for and false positives for quad screen, nuchal translucency and fetal survey ultrasound at 18-20 weeks gestation. Will inform us at the next visit if she wished to avail herself of these screens.  Instructed on best evidence for: weight gain for her BMI for pregnancy; healthy diet and foods to avoid; exercise and activity during pregnancy;avoiding exposure to toxoplasmosis; and maintenance of a generally healthy lifestyle.   Discussed the harms, benefits, side effects and alternative therapies for current prescribed and OTC medications.

## 2024-12-04 LAB
BACTERIA UR CULT: NO GROWTH
C TRACH DNA SPEC QL NAA+PROBE: NEGATIVE
FERRITIN SERPL-MCNC: 32 NG/ML (ref 6–175)
HBV SURFACE AG SERPL QL IA: NONREACTIVE
HCV AB SERPL QL IA: NONREACTIVE
HIV 1+2 AB+HIV1 P24 AG SERPL QL IA: NONREACTIVE
RUBV IGG SERPL QL IA: 1.25 INDEX
RUBV IGG SERPL QL IA: POSITIVE
T PALLIDUM AB SER QL: NONREACTIVE
TSH SERPL DL<=0.005 MIU/L-ACNC: 1.9 UIU/ML (ref 0.3–4.2)
VZV IGG SER QL IA: 8.15 S/CO
VZV IGG SER QL IA: POSITIVE

## 2024-12-05 ENCOUNTER — ANCILLARY PROCEDURE (OUTPATIENT)
Dept: ULTRASOUND IMAGING | Facility: CLINIC | Age: 29
End: 2024-12-05
Attending: FAMILY MEDICINE
Payer: COMMERCIAL

## 2024-12-05 DIAGNOSIS — O34.219 PREVIOUS CESAREAN DELIVERY, ANTEPARTUM CONDITION OR COMPLICATION: ICD-10-CM

## 2024-12-05 DIAGNOSIS — Z34.81 ENCOUNTER FOR SUPERVISION OF OTHER NORMAL PREGNANCY, FIRST TRIMESTER: ICD-10-CM

## 2024-12-05 PROCEDURE — 76801 OB US < 14 WKS SINGLE FETUS: CPT | Mod: TC | Performed by: STUDENT IN AN ORGANIZED HEALTH CARE EDUCATION/TRAINING PROGRAM

## 2024-12-05 PROCEDURE — 76817 TRANSVAGINAL US OBSTETRIC: CPT | Mod: TC | Performed by: STUDENT IN AN ORGANIZED HEALTH CARE EDUCATION/TRAINING PROGRAM

## 2024-12-31 ENCOUNTER — PRENATAL OFFICE VISIT (OUTPATIENT)
Dept: FAMILY MEDICINE | Facility: CLINIC | Age: 29
End: 2024-12-31
Payer: COMMERCIAL

## 2024-12-31 VITALS
HEART RATE: 82 BPM | OXYGEN SATURATION: 99 % | BODY MASS INDEX: 27.29 KG/M2 | HEIGHT: 63 IN | RESPIRATION RATE: 20 BRPM | DIASTOLIC BLOOD PRESSURE: 80 MMHG | SYSTOLIC BLOOD PRESSURE: 130 MMHG | TEMPERATURE: 97.5 F | WEIGHT: 154 LBS

## 2024-12-31 DIAGNOSIS — Z34.81 ENCOUNTER FOR SUPERVISION OF OTHER NORMAL PREGNANCY, FIRST TRIMESTER: Primary | ICD-10-CM

## 2024-12-31 DIAGNOSIS — Z31.83 IN VITRO FERTILIZATION: ICD-10-CM

## 2024-12-31 DIAGNOSIS — O34.219 PREVIOUS CESAREAN SECTION COMPLICATING PREGNANCY: ICD-10-CM

## 2024-12-31 ASSESSMENT — PAIN SCALES - GENERAL: PAINLEVEL_OUTOF10: NO PAIN (0)

## 2024-12-31 NOTE — PROGRESS NOTES
"12w2d  /80   Pulse 82   Temp 97.5  F (36.4  C) (Tympanic)   Resp 20   Ht 1.6 m (5' 3\")   Wt 65.6 kg (144 lb 9.6 oz)   LMP 10/06/2024   SpO2 99%   BMI 25.61 kg/m    IVF  Doing well.  No concerns today.  Prenatal flowsheet information is reviewed.  Discussed triple/quad screening.  Due to IVF, genetic screening not required.  May consider MSAFP for neural tube screening  Reportable signs and symptoms discussed.  Follow-up 4 weeks  Declines flu today     "

## 2025-02-04 ENCOUNTER — OFFICE VISIT (OUTPATIENT)
Dept: FAMILY MEDICINE | Facility: CLINIC | Age: 30
End: 2025-02-04
Attending: FAMILY MEDICINE

## 2025-02-04 VITALS
HEIGHT: 63 IN | OXYGEN SATURATION: 99 % | TEMPERATURE: 98.3 F | RESPIRATION RATE: 16 BRPM | BODY MASS INDEX: 27.93 KG/M2 | WEIGHT: 157.6 LBS | DIASTOLIC BLOOD PRESSURE: 72 MMHG | HEART RATE: 77 BPM | SYSTOLIC BLOOD PRESSURE: 122 MMHG

## 2025-02-04 DIAGNOSIS — O34.219 PREVIOUS CESAREAN SECTION COMPLICATING PREGNANCY: ICD-10-CM

## 2025-02-04 DIAGNOSIS — Z31.83 IN VITRO FERTILIZATION: ICD-10-CM

## 2025-02-04 DIAGNOSIS — Z34.82 ENCOUNTER FOR SUPERVISION OF OTHER NORMAL PREGNANCY, SECOND TRIMESTER: Primary | ICD-10-CM

## 2025-02-04 PROCEDURE — 99207 PR COMPLICATED OB VISIT: CPT | Performed by: FAMILY MEDICINE

## 2025-02-04 ASSESSMENT — PAIN SCALES - GENERAL: PAINLEVEL_OUTOF10: NO PAIN (0)

## 2025-02-04 NOTE — PROGRESS NOTES
"17w2d    /72   Pulse 77   Temp 98.3  F (36.8  C) (Oral)   Resp 16   Ht 1.6 m (5' 3\")   Wt 71.5 kg (157 lb 9.6 oz)   LMP 10/06/2024   SpO2 99%   BMI 27.92 kg/m      Doing well.  No concerns today.  Prenatal flowsheet information is reviewed.  Discussed triple/quad screening.  Testing not necessary, confirmed prior to implantation with IVF.  Reportable signs and symptoms discussed.  Follow-up 4 weeks for visit  US ordered for 3 weeks.   Plans repeat c-sec with Dr. Salamanca at [facility].  Declines flu/covid vaccines.  "

## 2025-02-20 ENCOUNTER — PRENATAL OFFICE VISIT (OUTPATIENT)
Dept: FAMILY MEDICINE | Facility: CLINIC | Age: 30
End: 2025-02-20

## 2025-02-20 ENCOUNTER — NURSE TRIAGE (OUTPATIENT)
Dept: NURSING | Facility: CLINIC | Age: 30
End: 2025-02-20

## 2025-02-20 ENCOUNTER — ANCILLARY PROCEDURE (OUTPATIENT)
Dept: ULTRASOUND IMAGING | Facility: CLINIC | Age: 30
End: 2025-02-20
Attending: FAMILY MEDICINE

## 2025-02-20 VITALS — SYSTOLIC BLOOD PRESSURE: 122 MMHG | BODY MASS INDEX: 28.17 KG/M2 | DIASTOLIC BLOOD PRESSURE: 85 MMHG | WEIGHT: 159 LBS

## 2025-02-20 DIAGNOSIS — O44.12 COMPLETE PLACENTA PREVIA WITH HEMORRHAGE, SECOND TRIMESTER: ICD-10-CM

## 2025-02-20 DIAGNOSIS — O46.92 SECOND TRIMESTER BLEEDING: Primary | ICD-10-CM

## 2025-02-20 DIAGNOSIS — O46.92 SECOND TRIMESTER BLEEDING: ICD-10-CM

## 2025-02-20 NOTE — PROGRESS NOTES
19w4d  /85   Wt 72.1 kg (159 lb)   LMP 10/06/2024   BMI 28.17 kg/m      Margarita is here today for an acute visit due to some vaginal bleeding she experienced this morning.  She awoke went to the bathroom and noticed a large amount of bright red blood in the toilet.  She had no pain when she urinated has had no urinary frequency or urgency.  Her last sexual intercourse was 4 days ago.  She has no abdominal pain and is uncertain if she is feeling fetal movement as she has yet to have really felt at this pregnancy.  She did use a home Doppler and she feels she did find a heart rate in the 150s.  She presented to [facility] emergency room  And was told they would do a vaginal exam and because it would take too long to get an ultrasound.  She appropriately declined that exam and promptly called our clinic and was able to get into see me today quickly.    She has not had any further bleeding and is having no abdominal pain or cramping.  She has not yet had her fetal survey which is scheduled for next week.    Her past pregnancies have otherwise been uneventful with the exception of having to have repeat C-sections.  This pregnancy is a result of IVF, she had previously had a tubal ligation and has a new partner who wanted to have a baby.  Dates are confirmed based on implantation and early ultrasound.    Bedside quick look ultrasound reveals single intrauterine pregnancy with cardiac activity at 144.  Placenta appears to be posterior.    Will obtain an ultrasound today to better assess placental location suspected placenta previa. Vaginal rest, lifting limitations and bleeding precautions discussed.        Addendum: Ultrasound reveals a complete placenta previa with an area suspicious for hemorrhage.  Fetal heart rate was 147 cervical length was 5.3 cm with a transabdominal measurement.    Margarita will follow-up as scheduled for her fetal survey next week and previously scheduled OB visits.  The recommendations listed  above were reviewed again.  Will plan to repeat ultrasound at 24 weeks.

## 2025-02-20 NOTE — TELEPHONE ENCOUNTER
"Triage call  Patient calling to report that she woke up this morning and she shad blood in the toilet when she urinated.  She went to the ED and they told her that there was not anything they could do for her. So she called the nurse line.  She did not have any blood in her panties  just in the toilet. She had tubal surgery in the past    Per protocol see PCP with in 24 hours.  Care advice given.  Verbalizes understanding and agrees with plan. Transferred to [facility] for a appointment.    Colleen Samano RN   [facility] Nurse Advisor  6:57 AM 2/20/2025  Reason for Disposition   Prior history of \"ectopic pregnancy\" or previous tubal surgery (e.g., tubal ligation)    Additional Information   Negative: Shock suspected (e.g., cold/pale/clammy skin, too weak to stand, low BP, rapid pulse)   Negative: Difficult to awaken or acting confused (e.g., disoriented, slurred speech)   Negative: Passed out (i.e., lost consciousness, collapsed and was not responding)   Negative: Sounds like a life-threatening emergency to the triager   Negative: [1] Vaginal bleeding AND [2] pregnant 20 or more weeks   Negative: Not pregnant or pregnancy status unknown   Negative: SEVERE abdominal pain   Negative: SEVERE vaginal bleeding (e.g., soaking 2 pads per hour or large blood clots and present 2 or more hours)   Negative: SEVERE dizziness (e.g., unable to stand, requires support to walk, feels like passing out)   Negative: [1] MODERATE vaginal bleeding (e.g., soaking 1 pad per hour; clots) AND [2] present > 6 hours   Negative: [1] MODERATE vaginal bleeding (e.g., soaking 1 pad per hour; clots) AND [2] pregnant > 12 weeks   Negative: Passed tissue (e.g., gray-white)   Negative: Shoulder pain   Negative: Pale skin (pallor) of new-onset or worsening   Negative: Patient sounds very sick or weak to the triager   Negative: [1] Constant abdominal pain AND [2] present > 2 hours   Negative: Fever 100.4 F (38.0 C) or higher   Negative: [1] " Intermittent lower abdominal pain (e.g., cramping) AND [2] present > 24 hours    Protocols used: Pregnancy - Vaginal Bleeding Less Than 20 Weeks EGA-A-AH

## 2025-02-26 ENCOUNTER — ANCILLARY PROCEDURE (OUTPATIENT)
Dept: ULTRASOUND IMAGING | Facility: CLINIC | Age: 30
End: 2025-02-26
Attending: FAMILY MEDICINE
Payer: MEDICAID

## 2025-02-26 DIAGNOSIS — Z34.82 ENCOUNTER FOR SUPERVISION OF OTHER NORMAL PREGNANCY, SECOND TRIMESTER: ICD-10-CM

## 2025-02-26 PROCEDURE — 76816 OB US FOLLOW-UP PER FETUS: CPT | Mod: TC | Performed by: RADIOLOGY

## 2025-03-06 ENCOUNTER — MYC MEDICAL ADVICE (OUTPATIENT)
Dept: FAMILY MEDICINE | Facility: CLINIC | Age: 30
End: 2025-03-06
Payer: MEDICAID

## 2025-03-13 ENCOUNTER — OFFICE VISIT (OUTPATIENT)
Dept: FAMILY MEDICINE | Facility: CLINIC | Age: 30
End: 2025-03-13
Attending: FAMILY MEDICINE
Payer: COMMERCIAL

## 2025-03-13 VITALS
TEMPERATURE: 97.3 F | WEIGHT: 163 LBS | OXYGEN SATURATION: 99 % | DIASTOLIC BLOOD PRESSURE: 59 MMHG | HEART RATE: 87 BPM | RESPIRATION RATE: 16 BRPM | SYSTOLIC BLOOD PRESSURE: 107 MMHG | BODY MASS INDEX: 27.83 KG/M2 | HEIGHT: 64 IN

## 2025-03-13 DIAGNOSIS — O34.219 PREVIOUS CESAREAN SECTION COMPLICATING PREGNANCY: ICD-10-CM

## 2025-03-13 DIAGNOSIS — O09.92 PREGNANCY, SUPERVISION, HIGH-RISK, SECOND TRIMESTER: Primary | ICD-10-CM

## 2025-03-13 DIAGNOSIS — O44.12 COMPLETE PLACENTA PREVIA WITH HEMORRHAGE, SECOND TRIMESTER: ICD-10-CM

## 2025-03-13 DIAGNOSIS — Z31.83 IN VITRO FERTILIZATION: ICD-10-CM

## 2025-03-13 PROBLEM — N84.0 ENDOMETRIAL POLYP: Status: RESOLVED | Noted: 2023-10-02 | Resolved: 2025-03-13

## 2025-03-13 ASSESSMENT — PAIN SCALES - GENERAL: PAINLEVEL_OUTOF10: NO PAIN (0)

## 2025-03-13 NOTE — PROGRESS NOTES
"22w4d  /59   Pulse 87   Temp 97.3  F (36.3  C) (Tympanic)   Resp 16   Ht 1.619 m (5' 3.75\")   Wt 73.9 kg (163 lb)   LMP 10/06/2024   SpO2 99%   Breastfeeding No   BMI 28.20 kg/m      Doing well.  No concerns today. No further bleeding. Is limiting lifting, prolonged walking and observing vaginal rest.  Prenatal flowsheet information is reviewed.  Discussed PTL, PROM, and when to call or come in.  Discussed kick counts and fetal movement.  Reportable signs and symptoms discussed.  Recheck US   Follow-up 4 weeks, plan 1hr testing.       "

## 2025-03-24 ENCOUNTER — ANCILLARY PROCEDURE (OUTPATIENT)
Dept: ULTRASOUND IMAGING | Facility: CLINIC | Age: 30
End: 2025-03-24
Attending: FAMILY MEDICINE
Payer: COMMERCIAL

## 2025-03-24 DIAGNOSIS — O09.92 PREGNANCY, SUPERVISION, HIGH-RISK, SECOND TRIMESTER: ICD-10-CM

## 2025-03-24 DIAGNOSIS — O44.12 COMPLETE PLACENTA PREVIA WITH HEMORRHAGE, SECOND TRIMESTER: ICD-10-CM

## 2025-03-24 PROCEDURE — 76816 OB US FOLLOW-UP PER FETUS: CPT | Mod: TC | Performed by: RADIOLOGY

## 2025-03-25 ENCOUNTER — MYC MEDICAL ADVICE (OUTPATIENT)
Dept: FAMILY MEDICINE | Facility: CLINIC | Age: 30
End: 2025-03-25
Payer: COMMERCIAL

## 2025-04-02 NOTE — PROGRESS NOTES
Be careful of your PICC line  Continue antibiotic protocol  Follow-up with primary care provider and home health   Doing well.  No concerns today.  1 hour GTT done today.  Prenatal flowsheet information is reviewed.  Discussed kick counts and fetal movement.  Reportable signs and symptoms discussed.  F/u 4 wks

## (undated) DEVICE — GLOVE BIOGEL PI MICRO SZ 7.5 48575

## (undated) DEVICE — PAD PERI W/WINGS 1580A

## (undated) DEVICE — NDL SPINAL 22GA 3.5" QUINCKE 405181

## (undated) DEVICE — PREP SKIN SCRUB TRAY 4461A

## (undated) DEVICE — NDL 19GA 1.5"

## (undated) DEVICE — SOL WATER IRRIG 1000ML BOTTLE 07139-09

## (undated) DEVICE — KIT PROCEDURE FLUENT IN/OUT FLOWPAK TISS TRAP FLT-112S

## (undated) DEVICE — SEAL SET MYOSURE ROD LENS SCOPE SINGLE USE 40-902

## (undated) DEVICE — PREP POVIDONE-IODINE 7.5% SCRUB 4OZ BOTTLE MDS093945

## (undated) DEVICE — SYR 50ML LL W/O NDL 309653

## (undated) DEVICE — PACK MINOR SBA15MIFSE

## (undated) DEVICE — PREP POVIDONE-IODINE 10% SOLUTION 4OZ BOTTLE MDS093944

## (undated) DEVICE — DRAPE GYN/UROLOGY FLUID POUCH TUR 29455

## (undated) DEVICE — PAD CHUX UNDERPAD 23X24" 7136

## (undated) DEVICE — SOL NACL 0.9% IRRIG 3000ML BAG 07972-08

## (undated) DEVICE — KIT ENDO FIRST STEP DISINFECTANT 200ML W/POUCH EP-4

## (undated) RX ORDER — FENTANYL CITRATE 50 UG/ML
INJECTION, SOLUTION INTRAMUSCULAR; INTRAVENOUS
Status: DISPENSED
Start: 2023-10-13

## (undated) RX ORDER — DEXAMETHASONE SODIUM PHOSPHATE 4 MG/ML
INJECTION, SOLUTION INTRA-ARTICULAR; INTRALESIONAL; INTRAMUSCULAR; INTRAVENOUS; SOFT TISSUE
Status: DISPENSED
Start: 2023-10-13

## (undated) RX ORDER — ONDANSETRON 2 MG/ML
INJECTION INTRAMUSCULAR; INTRAVENOUS
Status: DISPENSED
Start: 2023-10-13

## (undated) RX ORDER — KETOROLAC TROMETHAMINE 30 MG/ML
INJECTION, SOLUTION INTRAMUSCULAR; INTRAVENOUS
Status: DISPENSED
Start: 2023-10-13